# Patient Record
Sex: FEMALE | Race: WHITE | Employment: OTHER | URBAN - METROPOLITAN AREA
[De-identification: names, ages, dates, MRNs, and addresses within clinical notes are randomized per-mention and may not be internally consistent; named-entity substitution may affect disease eponyms.]

---

## 2018-10-04 PROBLEM — F32.A DEPRESSION: Status: ACTIVE | Noted: 2018-10-04

## 2018-10-04 PROBLEM — I10 HTN (HYPERTENSION): Status: ACTIVE | Noted: 2018-10-04

## 2018-10-04 PROBLEM — E78.5 HYPERLIPIDEMIA: Status: ACTIVE | Noted: 2018-10-04

## 2018-10-04 PROBLEM — E11.9 DIABETES (HCC): Status: ACTIVE | Noted: 2018-10-04

## 2018-10-25 ENCOUNTER — OFFICE VISIT (OUTPATIENT)
Dept: FAMILY MEDICINE CLINIC | Age: 78
End: 2018-10-25

## 2018-10-25 ENCOUNTER — HOSPITAL ENCOUNTER (OUTPATIENT)
Dept: GENERAL RADIOLOGY | Age: 78
Discharge: HOME | End: 2018-10-25
Attending: NURSE PRACTITIONER
Payer: MEDICARE

## 2018-10-25 VITALS
HEART RATE: 86 BPM | BODY MASS INDEX: 32.78 KG/M2 | TEMPERATURE: 98.1 F | DIASTOLIC BLOOD PRESSURE: 69 MMHG | OXYGEN SATURATION: 93 % | SYSTOLIC BLOOD PRESSURE: 131 MMHG | HEIGHT: 66 IN | WEIGHT: 204 LBS

## 2018-10-25 DIAGNOSIS — I10 ESSENTIAL HYPERTENSION: ICD-10-CM

## 2018-10-25 DIAGNOSIS — D47.1 MYELOPROLIFERATIVE DISORDER (HCC): ICD-10-CM

## 2018-10-25 DIAGNOSIS — Z79.4 TYPE 2 DIABETES MELLITUS WITHOUT COMPLICATION, WITH LONG-TERM CURRENT USE OF INSULIN (HCC): ICD-10-CM

## 2018-10-25 DIAGNOSIS — E11.9 TYPE 2 DIABETES MELLITUS WITHOUT COMPLICATION, WITH LONG-TERM CURRENT USE OF INSULIN (HCC): ICD-10-CM

## 2018-10-25 DIAGNOSIS — M54.2 NECK PAIN: ICD-10-CM

## 2018-10-25 DIAGNOSIS — M54.2 NECK PAIN: Primary | ICD-10-CM

## 2018-10-25 PROCEDURE — 72052 X-RAY EXAM NECK SPINE 6/>VWS: CPT

## 2018-10-25 RX ORDER — CYCLOBENZAPRINE HCL 10 MG
10 TABLET ORAL
Qty: 30 TAB | Refills: 0 | Status: SHIPPED | OUTPATIENT
Start: 2018-10-25 | End: 2019-03-27 | Stop reason: SDUPTHER

## 2018-10-25 RX ORDER — SERTRALINE HYDROCHLORIDE 50 MG/1
50 TABLET, FILM COATED ORAL DAILY
COMMUNITY
End: 2019-04-12 | Stop reason: SDUPTHER

## 2018-10-25 NOTE — PROGRESS NOTES
Assessment/Plan:     Diagnoses and all orders for this visit:    1. Neck pain  -     XR SPINE CERV W OBL/FLEX/EXT MIN 6 V COMP; Future  -     REFERRAL TO PHYSICAL THERAPY  - Unchanged, flexeril today, xray today, consider PT    2. Myeloproliferative disorder ([facility] [place] [address]   -managed by a specialist    3. Essential hypertension   -stable, continue current therapy    4. Type 2 diabetes mellitus without complication, with long-term current use of insulin (HCC)   -presumed stable    Other orders  -     cyclobenzaprine (FLEXERIL) 10 mg tablet; Take 1 Tab by mouth three (3) times daily as needed for Muscle Spasm(s). Follow-up Disposition:  Return for PRN. Discussed expected course/resolution/complications of diagnosis in detail with patient.    Medication risks/benefits/costs/interactions/alternatives discussed with patient.    Pt was given after visit summary which includes diagnoses, current medications & vitals. Pt expressed understanding with the diagnosis and plan        Subjective:      Antonella Barros is a 69 y.o. female who presents for had concerns including Neck Pain ( shoulder and neck pain x 1 month ). Here today for neck pain for about a month unchanged. Woke up one morning and was not able to move her neck and it has stayed the same for about a month. She has tried tylenol and a heating pad with no resolution. She denies numbness or tingling in upper extremities, denies fever. She denies and injury. Current Outpatient Medications   Medication Sig Dispense Refill    sertraline (ZOLOFT) 50 mg tablet Take  by mouth daily.  cyclobenzaprine (FLEXERIL) 10 mg tablet Take 1 Tab by mouth three (3) times daily as needed for Muscle Spasm(s). 30 Tab 0    atorvastatin (LIPITOR) 40 mg tablet Take 40 mg by mouth nightly.  butalbital-acetaminophen-caffeine (FIORICET, ESGIC) -40 mg per tablet Take 1 Tab by mouth as needed for Pain. Takes 1 to 2 tabs if needed.    Indications: MIGRAINE      cranberry extract 450 mg tab Take  by mouth daily.  fenofibrate nanocrystallized (TRICOR) 145 mg tablet Take 145 mg by mouth nightly.  glimepiride (AMARYL) 4 mg tablet Take 4 mg by mouth two (2) times a day.  insulin detemir (LEVEMIR FLEXTOUCH) 100 unit/mL (3 mL) inpn 16 Units by SubCUTAneous route nightly.  levothyroxine (SYNTHROID) 50 mcg tablet Take 50 mcg by mouth Daily (before breakfast).  insulin aspart (NOVOLOG) 100 unit/mL injection 10 Units by SubCUTAneous route daily (with breakfast).  insulin aspart (NOVOLOG) 100 unit/mL injection 15 Units by SubCUTAneous route daily (with dinner).  timolol (TIMOPTIC) 0.5 % ophthalmic solution Administer 1 Drop to both eyes two (2) times a day.  cholecalciferol (VITAMIN D3) 1,000 unit cap Take 1,000 Units by mouth daily. Pt takes softgel, every day.  anagrelide (AGRYLIN) 0.5 mg capsule Take 0.5 mg by mouth daily. Pt takes 1 tab (0.5) on tuesdays, thursdays , [address] and sundays.  acetaminophen (TYLENOL) 500 mg tablet Take 500 mg by mouth every six (6) hours as needed for Pain.  oxyCODONE IR (ROXICODONE) 5 mg immediate release tablet Take 1-2 Tabs by mouth every four (4) hours as needed. Max Daily Amount: 60 mg. 60 Tab 0    magnesium oxide (MAG-OX) 400 mg tablet Take 300 mg by mouth daily. Allergies   Allergen Reactions    Ace Inhibitors Other (comments)     \"Breaks out\"    Advil [Ibuprofen] Hives     Aspirin is a home med    Ativan [Lorazepam] Hives    Codeine Rash and Nausea and Vomiting     \"a little rash\"    Penicillin G Angioedema       ROS:   Review of Systems   Constitutional: Negative for diaphoresis and fever. Respiratory: Negative for cough and shortness of breath. Cardiovascular: Negative for chest pain. Genitourinary: Negative for dysuria, frequency and urgency. Musculoskeletal: Positive for back pain and neck pain. Negative for falls and joint pain.    Neurological: Negative for dizziness and headaches. Psychiatric/Behavioral: Negative for depression. Objective:     Visit Vitals  /69 (BP 1 Location: Right arm, BP Patient Position: Sitting)   Pulse 86   Temp 98.1 °F (36.7 °C) (Oral)   Ht 5' 6\" (1.676 m)   Wt 204 lb (92.5 kg)   SpO2 93%   BMI 32.93 kg/m²       Vitals and Nurse Documentation reviewed. Physical Exam   Constitutional: She is well-developed, well-nourished, and in no distress. No distress. HENT:   Head: Normocephalic and atraumatic. Cardiovascular: Normal rate, regular rhythm and normal heart sounds. Exam reveals no gallop and no friction rub. No murmur heard. Pulmonary/Chest: Effort normal and breath sounds normal. No respiratory distress. She has no wheezes. She has no rales. Musculoskeletal:        Right shoulder: She exhibits no swelling. Left shoulder: She exhibits decreased range of motion and tenderness. Right wrist: Normal.        Left wrist: Normal.        Cervical back: She exhibits decreased range of motion, tenderness and pain. She exhibits no swelling, no deformity and no laceration. Neurological: She is alert. She has normal strength and normal reflexes. She displays no weakness. Gait normal.   Skin: She is not diaphoretic.    Psychiatric: Affect normal.       Results for orders placed or performed during the hospital encounter of 05/27/16   CULTURE, URINE   Result Value Ref Range    Special Requests: NO SPECIAL REQUESTS      [place] Count 2000  COLONIES/mL        Culture result: MIXED UROGENITAL LEILANI ISOLATED     METABOLIC PANEL, COMPREHENSIVE   Result Value Ref Range    Sodium 137 136 - 145 mmol/L    Potassium 4.3 3.5 - 5.1 mmol/L    Chloride 102 97 - 108 mmol/L    CO2 29 21 - 32 mmol/L    Anion gap 6 5 - 15 mmol/L    Glucose 151 (H) 65 - 100 mg/dL    BUN 22 (H) 6 - 20 MG/DL    Creatinine 1.29 (H) 0.55 - 1.02 MG/DL    BUN/Creatinine ratio 17 12 - 20      GFR est AA 49 (L) >60 ml/min/1.73m2    GFR est non-AA 40 (L) >60 ml/min/1.73m2    Calcium 8.6 8.5 - 10.1 MG/DL    Bilirubin, total 0.4 0.2 - 1.0 MG/DL    ALT (SGPT) 114 (H) 12 - 78 U/L    AST (SGOT) 76 (H) 15 - 37 U/L    Alk.  phosphatase 112 45 - 117 U/L    Protein, total 6.5 6.4 - 8.2 g/dL    Albumin 2.5 (L) 3.5 - 5.0 g/dL    Globulin 4.0 2.0 - 4.0 g/dL    A-G Ratio 0.6 (L) 1.1 - 2.2     MAGNESIUM   Result Value Ref Range    Magnesium 2.2 1.6 - 2.4 mg/dL   CBC W/O DIFF   Result Value Ref Range    WBC 8.9 3.6 - 11.0 K/uL    RBC 3.48 (L) 3.80 - 5.20 M/uL    HGB 9.5 (L) 11.5 - 16.0 g/dL    HCT 30.0 (L) 35.0 - 47.0 %    MCV 86.2 80.0 - 99.0 FL    MCH 27.3 26.0 - 34.0 PG    MCHC 31.7 30.0 - 36.5 g/dL    RDW 17.4 (H) 11.5 - 14.5 %    PLATELET 619 884 - 806 K/uL   VITAMIN D, 25 HYDROXY   Result Value Ref Range    Vitamin D 25-Hydroxy 20.4 (L) 30 - 100 ng/mL   URINALYSIS W/MICROSCOPIC   Result Value Ref Range    Color YELLOW/STRAW      Appearance CLOUDY (A) CLEAR      Specific gravity 1.020 1.003 - 1.030      pH (UA) 6.0 5.0 - 8.0      Protein NEGATIVE  NEG mg/dL    Glucose NEGATIVE  NEG mg/dL    Ketone NEGATIVE  NEG mg/dL    Bilirubin NEGATIVE  NEG      Blood NEGATIVE  NEG      Urobilinogen 0.2 0.2 - 1.0 EU/dL    Nitrites NEGATIVE  NEG      Leukocyte Esterase SMALL (A) NEG      WBC 20-50 0 - 4 /hpf    RBC 0-5 0 - 5 /hpf    Epithelial cells MODERATE (A) FEW /lpf    Bacteria 1+ (A) NEG /hpf    Other: Renal Epithelial cells Present

## 2018-10-25 NOTE — PROGRESS NOTES
Mehran Christian is a 69 y.o. female      Chief Complaint   Patient presents with    Neck Pain      shoulder and neck pain x 1 month          1. Have you been to the ER, urgent care clinic since your last visit? Hospitalized since your last visit?  no    2. Have you seen or consulted any other health care providers outside of the [address] [place] [address] since your last visit? Include any pap smears or colon screening.    no

## 2018-10-26 NOTE — PROGRESS NOTES
I called the patient, advised of x ray results, She stated she is slightly better but still in a lot of pain. She says she already has the order for the PT, She requested a referral to see Dr. Elle Tello at [address], [address], [place], [address].

## 2018-10-26 NOTE — PROGRESS NOTES
Please let patient know her xray showed some osteopenia in the cervical spine and arthritis.   We can send her to PT and an orthopedic if the pain does not improve

## 2018-10-26 NOTE — PROGRESS NOTES
Patient called back and stated the doctor she was hoping for at [address] [place] [address] does not treat necks.  [facility] as she [facility]'t [facility] much

## 2019-03-27 ENCOUNTER — OFFICE VISIT (OUTPATIENT)
Dept: FAMILY MEDICINE CLINIC | Age: 79
End: 2019-03-27

## 2019-03-27 VITALS
OXYGEN SATURATION: 96 % | SYSTOLIC BLOOD PRESSURE: 109 MMHG | TEMPERATURE: 97.3 F | BODY MASS INDEX: 31.98 KG/M2 | HEART RATE: 83 BPM | WEIGHT: 199 LBS | HEIGHT: 66 IN | RESPIRATION RATE: 16 BRPM | DIASTOLIC BLOOD PRESSURE: 63 MMHG

## 2019-03-27 DIAGNOSIS — M54.2 NECK PAIN: Primary | ICD-10-CM

## 2019-03-27 DIAGNOSIS — D47.1 MYELOPROLIFERATIVE DISORDER (HCC): ICD-10-CM

## 2019-03-27 DIAGNOSIS — G43.009 MIGRAINE WITHOUT AURA AND WITHOUT STATUS MIGRAINOSUS, NOT INTRACTABLE: ICD-10-CM

## 2019-03-27 DIAGNOSIS — Z23 ENCOUNTER FOR IMMUNIZATION: ICD-10-CM

## 2019-03-27 DIAGNOSIS — R53.82 CHRONIC FATIGUE: ICD-10-CM

## 2019-03-27 DIAGNOSIS — I10 ESSENTIAL HYPERTENSION: ICD-10-CM

## 2019-03-27 DIAGNOSIS — E11.9 TYPE 2 DIABETES MELLITUS WITHOUT COMPLICATION, WITH LONG-TERM CURRENT USE OF INSULIN (HCC): ICD-10-CM

## 2019-03-27 DIAGNOSIS — Z79.4 TYPE 2 DIABETES MELLITUS WITHOUT COMPLICATION, WITH LONG-TERM CURRENT USE OF INSULIN (HCC): ICD-10-CM

## 2019-03-27 DIAGNOSIS — E08.8 DIABETES MELLITUS DUE TO UNDERLYING CONDITION WITH COMPLICATION, WITHOUT LONG-TERM CURRENT USE OF INSULIN (HCC): ICD-10-CM

## 2019-03-27 RX ORDER — NEBIVOLOL 20 MG/1
20 TABLET ORAL DAILY
COMMUNITY
End: 2020-02-28 | Stop reason: ALTCHOICE

## 2019-03-27 RX ORDER — ALLOPURINOL 100 MG/1
100 TABLET ORAL DAILY
COMMUNITY
End: 2019-08-15

## 2019-03-27 RX ORDER — INSULIN LISPRO 100 [IU]/ML
10 INJECTION, SOLUTION INTRAVENOUS; SUBCUTANEOUS 2 TIMES DAILY
COMMUNITY
Start: 2018-08-28 | End: 2019-09-30

## 2019-03-27 RX ORDER — BUTALBITAL, ACETAMINOPHEN AND CAFFEINE 50; 325; 40 MG/1; MG/1; MG/1
1 TABLET ORAL AS NEEDED
Qty: 30 TAB | Refills: 0 | Status: SHIPPED | OUTPATIENT
Start: 2019-03-27 | End: 2019-03-27 | Stop reason: SDUPTHER

## 2019-03-27 RX ORDER — BLOOD-GLUCOSE METER
EACH MISCELLANEOUS
COMMUNITY
Start: 2019-03-15

## 2019-03-27 RX ORDER — BUTALBITAL, ACETAMINOPHEN AND CAFFEINE 50; 325; 40 MG/1; MG/1; MG/1
1 TABLET ORAL
Qty: 30 TAB | Refills: 5 | Status: SHIPPED | OUTPATIENT
Start: 2019-03-27 | End: 2019-06-25

## 2019-03-27 RX ORDER — BLOOD GLUCOSE CONTROL HIGH,LOW
EACH MISCELLANEOUS
COMMUNITY
Start: 2019-03-15

## 2019-03-27 RX ORDER — BLOOD SUGAR DIAGNOSTIC
STRIP MISCELLANEOUS
COMMUNITY
Start: 2019-03-15

## 2019-03-27 RX ORDER — LANCETS
EACH MISCELLANEOUS
COMMUNITY
Start: 2019-03-15

## 2019-03-27 RX ORDER — CYCLOBENZAPRINE HCL 10 MG
10 TABLET ORAL
Qty: 30 TAB | Refills: 0 | Status: SHIPPED | OUTPATIENT
Start: 2019-03-27 | End: 2019-04-04

## 2019-03-27 RX ORDER — ISOPROPYL ALCOHOL 0.75 G/1
SWAB TOPICAL
COMMUNITY
Start: 2019-03-15

## 2019-03-27 NOTE — PROGRESS NOTES
Identified pt with two pt identifiers(name and ). Chief Complaint Patient presents with  Neck Pain  
  states has arthritis and and Bone break down in neck,  Fatigue  
  stated she is always tiered and cant sleep well  Headache  
  gets headaches from neck pain Health Maintenance Due Topic  LIPID PANEL Q1   
 FOOT EXAM Q1   
 MICROALBUMIN Q1   
 EYE EXAM RETINAL OR DILATED  DTaP/Tdap/Td series (1 - Tdap)  Shingrix Vaccine Age 50> (1 of 2)  GLAUCOMA SCREENING Q2Y  Bone Densitometry (Dexa) Screening  Pneumococcal 65+ years (1 of 2 - PCV13)  HEMOGLOBIN A1C Q6M   
 Influenza Age 5 to Adult  MEDICARE YEARLY EXAM   
 
 
Wt Readings from Last 3 Encounters:  
19 199 lb (90.3 kg) 10/25/18 204 lb (92.5 kg) 16 211 lb 3 oz (95.8 kg) Temp Readings from Last 3 Encounters:  
19 97.3 °F (36.3 °C) (Oral) 10/25/18 98.1 °F (36.7 °C) (Oral) 16 98.5 °F (36.9 °C) BP Readings from Last 3 Encounters:  
19 109/63  
10/25/18 131/69  
16 131/62 Pulse Readings from Last 3 Encounters:  
19 83  
10/25/18 86  
16 69 Learning Assessment: 
:  
 
Learning Assessment 10/25/2018 PRIMARY LEARNER Patient HIGHEST LEVEL OF EDUCATION - PRIMARY LEARNER  DID NOT GRADUATE HIGH SCHOOL PRIMARY LANGUAGE ENGLISH  
LEARNER PREFERENCE PRIMARY READING  
ANSWERED BY self RELATIONSHIP SELF Depression Screening: 
:  
 
No flowsheet data found. Fall Risk Assessment: 
:  
 
Fall Risk Assessment, last 12 mths 3/27/2019 Able to walk? Yes Fall in past 12 months? No  
Fall with injury? -  
 
 
Abuse Screening: 
:  
 
No flowsheet data found.  
 
Coordination of Care Questionnaire: 
:  
 
1) Have you been to an emergency room, urgent care clinic since your last visit? no  
Hospitalized since your last visit? no          
 
2) Have you seen or consulted any other health care providers outside of [facility] since your last visit? yes Dr. Kishan Grace  Endocrinologist, Cardiologist Dr. Lauren Boss  (Include any pap smears or colon screenings in this section.) 3) Do you have an Advance Directive on file? no 
Are you interested in receiving information about Advance Directives? no 
 
Patient is accompanied by self I have received verbal consent from Fredrick Andino to discuss any/all medical information while they are present in the room. Reviewed record in preparation for visit and have obtained necessary documentation. Medication reconciliation up to date and corrected with patient at this time.

## 2019-03-27 NOTE — PROGRESS NOTES
Lauren Keen is a 69 y.o. female who presents today with the following: HPI Chief Complaint Patient presents with  Neck Pain  
  states has arthritis and and Bone break down in neck,  Fatigue  
  stated she is always tiered and cant sleep well  Headache  
  gets headaches from neck pain Review of Systems Constitutional: Negative. HENT: Negative. Eyes: Negative. Respiratory: Negative. Cardiovascular: Negative. Gastrointestinal: Negative. Genitourinary: Negative. Musculoskeletal: Negative. Skin: Negative. Neurological: Negative. Endo/Heme/Allergies: Negative. Psychiatric/Behavioral: Negative. Physical Exam  
Constitutional: She is oriented to person, place, and time and well-developed, well-nourished, and in no distress. HENT:  
Head: Normocephalic and atraumatic. Right Ear: External ear normal.  
Left Ear: External ear normal.  
Nose: Nose normal.  
Mouth/Throat: No oropharyngeal exudate. Eyes: Conjunctivae are normal.  
Neck: Normal range of motion. Neck supple. No thyromegaly present. Cardiovascular: Normal rate and regular rhythm. Pulmonary/Chest: Effort normal and breath sounds normal.  
Abdominal: Soft. Bowel sounds are normal. She exhibits no distension. There is no tenderness. Musculoskeletal: Normal range of motion. She exhibits no edema. Lymphadenopathy:  
  She has no cervical adenopathy. Neurological: She is alert and oriented to person, place, and time. Skin: Skin is warm and dry. Psychiatric: Mood and affect normal.  
Nursing note and vitals reviewed. /63 (BP 1 Location: Left arm, BP Patient Position: Sitting)   Pulse 83   Temp 97.3 °F (36.3 °C) (Oral)   Resp 16   Ht 5' 6\" (1.676 m)   Wt 199 lb (90.3 kg)   SpO2 96%   BMI 32.12 kg/m² Allergies Allergen Reactions  Ace Inhibitors Other (comments) \"Breaks out\"  Advil [Ibuprofen] Hives Aspirin is a home med  Ativan [Lorazepam] Hives  Codeine Rash and Nausea and Vomiting  
  \"a little rash\"  Penicillin G Angioedema Current Outpatient Medications Medication Sig  BD SINGLE USE SWABS REGULAR padm  allopurinol (ZYLOPRIM) 100 mg tablet Take 100 mg by mouth.  ACCU-CHEK CHANO CONTROL SOLN soln  ACCU-CHEK CHANO PLUS TEST STRP strip  ACCU-CHEK CHANO PLUS METER misc  insulin lispro (HUMALOG U-100 INSULIN) 100 unit/mL injection Indications: sliding scale  ACCU-CHEK SOFTCLIX LANCETS misc   
 nebivolol (BYSTOLIC) 20 mg tablet Take 20 mg by mouth.  cyclobenzaprine (FLEXERIL) 10 mg tablet Take 1 Tab by mouth three (3) times daily as needed for Muscle Spasm(s).  butalbital-acetaminophen-caffeine (FIORICET, ESGIC) -40 mg per tablet Take 1 Tab by mouth as needed for Pain. Takes 1 to 2 tabs if needed. Indications: migraine headache  sertraline (ZOLOFT) 50 mg tablet Take  by mouth daily.  atorvastatin (LIPITOR) 40 mg tablet Take 40 mg by mouth nightly.  cranberry extract 450 mg tab Take  by mouth daily.  fenofibrate nanocrystallized (TRICOR) 145 mg tablet Take 145 mg by mouth nightly.  glimepiride (AMARYL) 4 mg tablet Take 4 mg by mouth two (2) times a day.  levothyroxine (SYNTHROID) 50 mcg tablet Take 50 mcg by mouth Daily (before breakfast).  insulin aspart (NOVOLOG) 100 unit/mL injection 10 Units by SubCUTAneous route daily (with breakfast).  insulin aspart (NOVOLOG) 100 unit/mL injection 15 Units by SubCUTAneous route daily (with dinner).  timolol (TIMOPTIC) 0.5 % ophthalmic solution Administer 1 Drop to both eyes two (2) times a day.  anagrelide (AGRYLIN) 0.5 mg capsule Take 0.5 mg by mouth daily. Pt takes 1 tab (0.5) on tuesdays, thursdays , [address] and sundays.  acetaminophen (TYLENOL) 500 mg tablet Take 500 mg by mouth every six (6) hours as needed for Pain.   
 oxyCODONE IR (ROXICODONE) 5 mg immediate release tablet Take 1-2 Tabs by mouth every four (4) hours as needed. Max Daily Amount: 60 mg.  
 insulin detemir (LEVEMIR FLEXTOUCH) 100 unit/mL (3 mL) inpn 16 Units by SubCUTAneous route nightly.  magnesium oxide (MAG-OX) 400 mg tablet Take 300 mg by mouth daily.  cholecalciferol (VITAMIN D3) 1,000 unit cap Take 1,000 Units by mouth daily. Pt takes softgel, every day. No current facility-administered medications for this visit. Past Medical History:  
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  
 Diabetes ([facility] [address]  Dyslipidemia  Hypertension  Hypothyroidism  Ill-defined condition ACE INHIBITOR ALLERGY WITH COUGH  Ill-defined condition HYPERTENSIVE CARDIOMYOPATHY WITH MILD DIASTOLIC DYSFUNCTION  
 Ill-defined condition   
 glaucoma  PUD (peptic ulcer disease) Past Surgical History:  
Procedure Laterality Date  CARDIAC SURG PROCEDURE UNLIST  1/8/2015  
 1 stent placed  HX BUNIONECTOMY Left  HX CATARACT REMOVAL Bilateral   
 HX HAMMER TOE REPAIR Left  HX HEENT    
 goiter removed  HX LAP CHOLECYSTECTOMY  HX ORTHOPAEDIC No results found for this visit on 03/27/19. 1. Neck pain 
 
- cyclobenzaprine (FLEXERIL) 10 mg tablet; Take 1 Tab by mouth three (3) times daily as needed for Muscle Spasm(s). Dispense: 30 Tab; Refill: 0 
- REFERRAL TO PHYSICAL THERAPY 2. Encounter for immunization 
 
- INFLUENZA VACCINE INACTIVATED (IIV), SUBUNIT, ADJUVANTED, IM 
- ADMIN INFLUENZA VIRUS VAC 3. Migraine without aura and without status migrainosus, not intractable - butalbital-acetaminophen-caffeine (FIORICET, ESGIC) -40 mg per tablet; Take 1 Tab by mouth as needed for Pain. Takes 1 to 2 tabs if needed. Indications: migraine headache  Dispense: 30 Tab; Refill: 0 
 
4.  Chronic fatigue 
 
- CBC WITH AUTOMATED DIFF 
 - METABOLIC PANEL, COMPREHENSIVE 
- VITAMIN B12 
- VITAMIN D, 25 HYDROXY 
- TSH 3RD GENERATION 
- URINALYSIS W/ RFLX MICROSCOPIC 
- IRON PROFILE 
 
5. Essential hypertension Low bp 
 
6. Type 2 diabetes mellitus without complication, with long-term current use of insulin ([facility] [address] - METABOLIC PANEL, COMPREHENSIVE 
- LIPID PANEL 7. Myeloproliferative disorder ([facility] [address] 8. Diabetes mellitus due to underlying condition with complication, without long-term current use [facility] insulin [facility]  
 
- IRON PROFILE Follow-up and Dispositions · Return in about 2 weeks (around 4/10/2019) for follow up. I have discussed the diagnosis with the patient and the intended plan as seen in the above orders. The patient has received an after-visit summary and questions were answered concerning future plans. I have discussed medication side effects and warnings with the patient as well. The patient agrees and understands above plan.   
 
 
 
 
Paris [facility] MD

## 2019-03-27 NOTE — TELEPHONE ENCOUNTER
Pharmacy requesting Clarification:  \"RX for but/apap/caff -40MG Tab-please provide missing dosing frequency for PRN directions\"

## 2019-03-28 DIAGNOSIS — N39.0 URINARY TRACT INFECTION WITHOUT HEMATURIA, SITE UNSPECIFIED: Primary | ICD-10-CM

## 2019-03-28 PROBLEM — E11.21 TYPE 2 DIABETES WITH NEPHROPATHY (HCC): Status: ACTIVE | Noted: 2019-03-28

## 2019-03-28 LAB
25(OH)D3+25(OH)D2 SERPL-MCNC: 25.9 NG/ML (ref 30–100)
ALBUMIN SERPL-MCNC: 4.3 G/DL (ref 3.5–4.8)
ALBUMIN/GLOB SERPL: 1.5 {RATIO} (ref 1.2–2.2)
ALP SERPL-CCNC: 88 IU/L (ref 39–117)
ALT SERPL-CCNC: 20 IU/L (ref 0–32)
APPEARANCE UR: CLEAR
AST SERPL-CCNC: 24 IU/L (ref 0–40)
BACTERIA #/AREA URNS HPF: ABNORMAL /[HPF]
BASOPHILS # BLD AUTO: 0.1 X10E3/UL (ref 0–0.2)
BASOPHILS NFR BLD AUTO: 1 %
BILIRUB SERPL-MCNC: 0.3 MG/DL (ref 0–1.2)
BILIRUB UR QL STRIP: NEGATIVE
BUN SERPL-MCNC: 23 MG/DL (ref 8–27)
BUN/CREAT SERPL: 25 (ref 12–28)
CALCIUM SERPL-MCNC: 9.8 MG/DL (ref 8.7–10.3)
CASTS URNS QL MICRO: ABNORMAL /LPF
CHLORIDE SERPL-SCNC: 99 MMOL/L (ref 96–106)
CHOLEST SERPL-MCNC: 311 MG/DL (ref 100–199)
CO2 SERPL-SCNC: 25 MMOL/L (ref 20–29)
COLOR UR: YELLOW
CREAT SERPL-MCNC: 0.93 MG/DL (ref 0.57–1)
EOSINOPHIL # BLD AUTO: 0.2 X10E3/UL (ref 0–0.4)
EOSINOPHIL NFR BLD AUTO: 3 %
EPI CELLS #/AREA URNS HPF: ABNORMAL /HPF (ref 0–10)
ERYTHROCYTE [DISTWIDTH] IN BLOOD BY AUTOMATED COUNT: 16.1 % (ref 12.3–15.4)
GLOBULIN SER CALC-MCNC: 2.8 G/DL (ref 1.5–4.5)
GLUCOSE SERPL-MCNC: 176 MG/DL (ref 65–99)
GLUCOSE UR QL: NEGATIVE
HCT VFR BLD AUTO: 41.1 % (ref 34–46.6)
HDLC SERPL-MCNC: 71 MG/DL
HGB BLD-MCNC: 13 G/DL (ref 11.1–15.9)
HGB UR QL STRIP: NEGATIVE
IMM GRANULOCYTES # BLD AUTO: 0 X10E3/UL (ref 0–0.1)
IMM GRANULOCYTES NFR BLD AUTO: 0 %
IRON SATN MFR SERPL: 14 % (ref 15–55)
IRON SERPL-MCNC: 53 UG/DL (ref 27–139)
KETONES UR QL STRIP: NEGATIVE
LDLC SERPL CALC-MCNC: 181 MG/DL (ref 0–99)
LEUKOCYTE ESTERASE UR QL STRIP: ABNORMAL
LYMPHOCYTES # BLD AUTO: 1.4 X10E3/UL (ref 0.7–3.1)
LYMPHOCYTES NFR BLD AUTO: 19 %
MCH RBC QN AUTO: 26.1 PG (ref 26.6–33)
MCHC RBC AUTO-ENTMCNC: 31.6 G/DL (ref 31.5–35.7)
MCV RBC AUTO: 83 FL (ref 79–97)
MICRO URNS: ABNORMAL
MONOCYTES # BLD AUTO: 0.7 X10E3/UL (ref 0.1–0.9)
MONOCYTES NFR BLD AUTO: 9 %
MUCOUS THREADS URNS QL MICRO: PRESENT
NEUTROPHILS # BLD AUTO: 5.2 X10E3/UL (ref 1.4–7)
NEUTROPHILS NFR BLD AUTO: 68 %
NITRITE UR QL STRIP: POSITIVE
PH UR STRIP: 6 [PH] (ref 5–7.5)
PLATELET # BLD AUTO: 226 X10E3/UL (ref 150–379)
POTASSIUM SERPL-SCNC: 4.3 MMOL/L (ref 3.5–5.2)
PROT SERPL-MCNC: 7.1 G/DL (ref 6–8.5)
PROT UR QL STRIP: NEGATIVE
RBC # BLD AUTO: 4.98 X10E6/UL (ref 3.77–5.28)
RBC #/AREA URNS HPF: ABNORMAL /HPF (ref 0–2)
SODIUM SERPL-SCNC: 141 MMOL/L (ref 134–144)
SP GR UR: 1.02 (ref 1–1.03)
TIBC SERPL-MCNC: 367 UG/DL (ref 250–450)
TRIGL SERPL-MCNC: 296 MG/DL (ref 0–149)
TSH SERPL DL<=0.005 MIU/L-ACNC: 1 UIU/ML (ref 0.45–4.5)
UIBC SERPL-MCNC: 314 UG/DL (ref 118–369)
UROBILINOGEN UR STRIP-MCNC: 0.2 MG/DL (ref 0.2–1)
VIT B12 SERPL-MCNC: 558 PG/ML (ref 232–1245)
VLDLC SERPL CALC-MCNC: 59 MG/DL (ref 5–40)
WBC # BLD AUTO: 7.6 X10E3/UL (ref 3.4–10.8)
WBC #/AREA URNS HPF: ABNORMAL /HPF (ref 0–5)

## 2019-03-28 RX ORDER — NITROFURANTOIN 25; 75 MG/1; MG/1
100 CAPSULE ORAL 2 TIMES DAILY
Qty: 14 CAP | Refills: 0 | Status: SHIPPED | OUTPATIENT
Start: 2019-03-28 | End: 2019-04-01 | Stop reason: SDUPTHER

## 2019-03-28 NOTE — PROGRESS NOTES
plz call pt let her know she has  A uti. I have faxed her antibiotic. She needs to start taking it.  thanks

## 2019-04-01 DIAGNOSIS — N39.0 URINARY TRACT INFECTION WITHOUT HEMATURIA, SITE UNSPECIFIED: ICD-10-CM

## 2019-04-01 RX ORDER — NITROFURANTOIN 25; 75 MG/1; MG/1
100 CAPSULE ORAL 2 TIMES DAILY
Qty: 14 CAP | Refills: 0 | Status: SHIPPED | OUTPATIENT
Start: 2019-04-01 | End: 2019-04-11 | Stop reason: ALTCHOICE

## 2019-04-01 NOTE — PROGRESS NOTES
Called and spoke with patient after verifying name and . Notified patient of lab results and recommendations from provider. Patient was given a chance to ask questions and verbalized an understanding of the information. Patient states she would like medication sent to the [facility] and [place]

## 2019-04-04 ENCOUNTER — TELEPHONE (OUTPATIENT)
Dept: FAMILY MEDICINE CLINIC | Age: 79
End: 2019-04-04

## 2019-04-04 DIAGNOSIS — M54.2 NECK PAIN: Primary | ICD-10-CM

## 2019-04-04 RX ORDER — METHOCARBAMOL 500 MG/1
500 TABLET, FILM COATED ORAL 3 TIMES DAILY
Qty: 90 TAB | Refills: 0 | Status: SHIPPED | OUTPATIENT
Start: 2019-04-04 | End: 2019-05-04

## 2019-04-11 ENCOUNTER — OFFICE VISIT (OUTPATIENT)
Dept: FAMILY MEDICINE CLINIC | Age: 79
End: 2019-04-11

## 2019-04-11 VITALS
WEIGHT: 194 LBS | HEART RATE: 99 BPM | TEMPERATURE: 97.5 F | HEIGHT: 66 IN | OXYGEN SATURATION: 92 % | DIASTOLIC BLOOD PRESSURE: 65 MMHG | BODY MASS INDEX: 31.18 KG/M2 | RESPIRATION RATE: 18 BRPM | SYSTOLIC BLOOD PRESSURE: 106 MMHG

## 2019-04-11 DIAGNOSIS — E78.5 HYPERLIPIDEMIA, UNSPECIFIED HYPERLIPIDEMIA TYPE: ICD-10-CM

## 2019-04-11 DIAGNOSIS — R21 RASH AND NONSPECIFIC SKIN ERUPTION: ICD-10-CM

## 2019-04-11 DIAGNOSIS — D64.9 ANEMIA, UNSPECIFIED TYPE: ICD-10-CM

## 2019-04-11 DIAGNOSIS — E55.9 VITAMIN D DEFICIENCY: Primary | ICD-10-CM

## 2019-04-11 RX ORDER — CHOLECALCIFEROL (VITAMIN D3) 125 MCG
5000 CAPSULE ORAL DAILY
Qty: 90 CAP | Refills: 1 | Status: SHIPPED | OUTPATIENT
Start: 2019-04-11 | End: 2019-08-22 | Stop reason: SDUPTHER

## 2019-04-11 RX ORDER — ATORVASTATIN CALCIUM 40 MG/1
40 TABLET, FILM COATED ORAL
Qty: 90 TAB | Refills: 1 | Status: SHIPPED | OUTPATIENT
Start: 2019-04-11 | End: 2019-08-22 | Stop reason: SDUPTHER

## 2019-04-11 RX ORDER — LEVOCETIRIZINE DIHYDROCHLORIDE 5 MG/1
TABLET, FILM COATED ORAL
Qty: 90 TAB | Refills: 0 | Status: SHIPPED | OUTPATIENT
Start: 2019-04-11 | End: 2019-07-07 | Stop reason: SDUPTHER

## 2019-04-11 RX ORDER — LEVOCETIRIZINE DIHYDROCHLORIDE 5 MG/1
5 TABLET, FILM COATED ORAL
Qty: 30 TAB | Refills: 0 | Status: SHIPPED | OUTPATIENT
Start: 2019-04-11 | End: 2019-04-11 | Stop reason: SDUPTHER

## 2019-04-11 RX ORDER — LANOLIN ALCOHOL/MO/W.PET/CERES
325 CREAM (GRAM) TOPICAL
Qty: 30 TAB | Refills: 11 | Status: SHIPPED | OUTPATIENT
Start: 2019-04-11 | End: 2021-06-02

## 2019-04-11 NOTE — PROGRESS NOTES
Identified pt with two pt identifiers(name and ). Reviewed record in preparation for visit and have obtained necessary documentation. Chief Complaint   Patient presents with    Neck Pain     f/u    Medication Reaction     pt thinks recent course of macrobid (for recent UTI) caused her to break out in a rash        Health Maintenance Due   Topic    FOOT EXAM Q1     MICROALBUMIN Q1     EYE EXAM RETINAL OR DILATED     DTaP/Tdap/Td series (1 - Tdap)    Shingrix Vaccine Age 50> (1 of 2)    GLAUCOMA SCREENING Q2Y     Bone Densitometry (Dexa) Screening     Pneumococcal 65+ years (1 of 2 - PCV13)    HEMOGLOBIN A1C Q6M     MEDICARE YEARLY EXAM        Coordination of Care Questionnaire:  :   1) Have you been to an emergency room, urgent care, or hospitalized since your last visit? If yes, where when, and reason for visit? no     2. Have seen or consulted any other health care provider since your last visit? If yes, where when, and reason for visit? NO    Patient is accompanied by self I have received verbal consent from Esteban Barnett to discuss any/all medical information while they are present in the room.

## 2019-04-11 NOTE — PROGRESS NOTES
Carlee Harada is a 69 y.o. female who presents today with the following:    Neck Pain     Medication Reaction     Rash    The history is provided by the patient. This is a new problem. The current episode started 2 days ago. The problem has not changed since onset. The problem is associated with an unknown factor. There has been no fever. The rash is present on the right lower leg, right arm, left lower leg and left arm. Associated symptoms include itching. Risk factors include new medication. She has tried nothing for the symptoms. Chief Complaint   Patient presents with    Neck Pain     f/u    Medication Reaction     pt thinks recent course of macrobid (for recent UTI) caused her to break out in a rash     NO URINARY SYMPTOMS. FINISHED MACROBID YESTERDAY. RASH APPEARED DAY BEFORE YESTERDAY. PATIENT HAD ALREADY BEEN TAKING MACROBID FOR 5 DAYS THEN RASH APPEARED SO IM NOT CONVINCED IT WAS CAUSED BY MACROBID. LAB RESULTS REVIEWED IN DETAIL WITH PATIENT. DIABETES : DR Rayshawn Cummins  ONCOLOGIST: DR. Mustapha Jeter  CARDIOLOGIST: DR. Unique Chowdhury      Review of Systems   Constitutional: Positive for malaise/fatigue. HENT: Positive for congestion. Eyes: Negative. Respiratory: Positive for cough. Cardiovascular: Negative. Gastrointestinal: Negative. Genitourinary: Negative. Musculoskeletal: Positive for back pain, myalgias and neck pain. Skin: Positive for itching and rash. Neurological: Negative. Endo/Heme/Allergies: Negative. Psychiatric/Behavioral: Negative. Physical Exam   Constitutional: She is oriented to person, place, and time and well-developed, well-nourished, and in no distress. HENT:   Head: Normocephalic and atraumatic. Right Ear: External ear normal.   Left Ear: External ear normal.   Nose: Nose normal.   Mouth/Throat: No oropharyngeal exudate. Eyes: Conjunctivae are normal.   Neck: Normal range of motion. Neck supple. No thyromegaly present.    Cardiovascular: Normal rate and regular rhythm. Pulmonary/Chest: Effort normal and breath sounds normal.   Abdominal: Soft. Bowel sounds are normal. She exhibits no distension. There is no tenderness. Musculoskeletal: Normal range of motion. She exhibits no edema. Lymphadenopathy:     She has no cervical adenopathy. Neurological: She is alert and oriented to person, place, and time. Skin: Skin is warm and dry. Rash noted. Rash is maculopapular. There is erythema. RASH NOTED MALIK UPPER & LOWER EXTREMITIES. Psychiatric: Mood and affect normal.   Nursing note and vitals reviewed. /65   Pulse 99   Temp 97.5 °F (36.4 °C) (Oral)   Resp 18   Ht 5' 6\" (1.676 m)   Wt 194 lb (88 kg)   SpO2 92%   BMI 31.31 kg/m²     Allergies   Allergen Reactions    Ace Inhibitors Other (comments)     \"Breaks out\"    Advil [Ibuprofen] Hives     Aspirin is a home med    Ativan [Lorazepam] Hives    Codeine Rash and Nausea and Vomiting     \"a little rash\"    Penicillin G Angioedema       Current Outpatient Medications   Medication Sig    ferrous sulfate 325 mg (65 mg iron) tablet Take 1 Tab by mouth daily (with breakfast).  cholecalciferol (VITAMIN D3) 5,000 unit capsule Take 1 Cap by mouth daily.  atorvastatin (LIPITOR) 40 mg tablet Take 1 Tab by mouth nightly.  levocetirizine (XYZAL) 5 mg tablet Take 1 Tab by mouth daily as needed for Allergies.  methocarbamol (ROBAXIN) 500 mg tablet Take 1 Tab by mouth three (3) times daily for 30 days.  BD SINGLE USE SWABS REGULAR padm     allopurinol (ZYLOPRIM) 100 mg tablet Take 100 mg by mouth.  ACCU-CHEK CHANO CONTROL SOLN soln     ACCU-CHEK CHANO PLUS TEST STRP strip     ACCU-CHEK CHANO PLUS METER misc     insulin lispro (HUMALOG U-100 INSULIN) 100 unit/mL injection Indications: sliding scale    ACCU-CHEK SOFTCLIX LANCETS misc     nebivolol (BYSTOLIC) 20 mg tablet Take 20 mg by mouth daily.  sertraline (ZOLOFT) 50 mg tablet Take 50 mg by mouth daily.     cranberry extract 450 mg tab Take  by mouth daily.  fenofibrate nanocrystallized (TRICOR) 145 mg tablet Take 145 mg by mouth nightly.  glimepiride (AMARYL) 4 mg tablet Take 4 mg by mouth two (2) times a day.  levothyroxine (SYNTHROID) 50 mcg tablet Take 50 mcg by mouth Daily (before breakfast).  magnesium oxide (MAG-OX) 400 mg tablet Take 300 mg by mouth daily.  insulin aspart (NOVOLOG) 100 unit/mL injection 10 Units by SubCUTAneous route daily (with breakfast).  insulin aspart (NOVOLOG) 100 unit/mL injection 15 Units by SubCUTAneous route daily (with dinner).  timolol (TIMOPTIC) 0.5 % ophthalmic solution Administer 1 Drop to both eyes two (2) times a day.  anagrelide (AGRYLIN) 0.5 mg capsule Take 0.5 mg by mouth daily. Pt takes 1 tab (0.5) on tuesdays, thursdays , [address] and sundays.  acetaminophen (TYLENOL) 500 mg tablet Take 500 mg by mouth every six (6) hours as needed for Pain.  butalbital-acetaminophen-caffeine (FIORICET, ESGIC) -40 mg per tablet Take 1 Tab by mouth every six (6) hours as needed for Pain (do not exceed 300 mg in 24 hours) for up to 90 days. Takes 1 to 2 tabs if needed. Indications: migraine headache    oxyCODONE IR (ROXICODONE) 5 mg immediate release tablet Take 1-2 Tabs by mouth every four (4) hours as needed. Max Daily Amount: 60 mg. (Patient not taking: Reported on 4/11/2019)    insulin detemir (LEVEMIR FLEXTOUCH) 100 unit/mL (3 mL) inpn 16 Units by SubCUTAneous route nightly. No current facility-administered medications for this visit.         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    Diabetes ([facility] [address]     Dyslipidemia     Hypertension     Hypothyroidism     Ill-defined condition     ACE INHIBITOR ALLERGY WITH COUGH    Ill-defined condition     HYPERTENSIVE CARDIOMYOPATHY WITH MILD DIASTOLIC DYSFUNCTION  Ill-defined condition     glaucoma    PUD (peptic ulcer disease)        Past Surgical History:   Procedure Laterality Date    CARDIAC SURG PROCEDURE UNLIST  1/8/2015    1 stent placed    HX BUNIONECTOMY Left     HX CATARACT REMOVAL Bilateral     HX HAMMER TOE REPAIR Left     HX HEENT      goiter removed    HX LAP CHOLECYSTECTOMY      HX ORTHOPAEDIC         No results found for this visit on 04/11/19. 1. Vitamin D deficiency  INCREASED DOSE OF VITAMIN D TODAY. - cholecalciferol (VITAMIN D3) 5,000 unit capsule; Take 1 Cap by mouth daily. Dispense: 90 Cap; Refill: 1    2. Anemia, unspecified type  IRON SAT IS LOW. START IRON SUPPLEMENT. - ferrous sulfate 325 mg (65 mg iron) tablet; Take 1 Tab by mouth daily (with breakfast). Dispense: 30 Tab; Refill: 11    3. Rash and nonspecific skin eruption  UNABLE TO PRESCRIBE ATARAX DUE TO DRUG INTERACTION. - levocetirizine (XYZAL) 5 mg tablet; Take 1 Tab by mouth daily as needed for Allergies. Dispense: 30 Tab; Refill: 0    4. Hyperlipidemia, unspecified hyperlipidemia type    CHOLESTEROL ELEVATED. PATIENT CURRENTLY OUT OF MEDICATION. NO DOSE CHANGE. REFILLED MEDICATION. RECHECK CHOLESTEROL IN 3 MONTHS. Follow-up and Dispositions    · Return in about 2 weeks (around 4/25/2019) for [employer].         I have discussed the diagnosis with the patient and the intended plan as seen in the above orders. The patient has received an after-visit summary and questions were answered concerning future plans. I have discussed medication side effects and warnings with the patient as well. The patient agrees and understands above plan.            Yaakov Cole MD

## 2019-04-15 RX ORDER — SERTRALINE HYDROCHLORIDE 50 MG/1
TABLET, FILM COATED ORAL
Qty: 90 TAB | Refills: 2 | Status: SHIPPED | OUTPATIENT
Start: 2019-04-15 | End: 2019-08-15 | Stop reason: SDUPTHER

## 2019-04-15 RX ORDER — SERTRALINE HYDROCHLORIDE 50 MG/1
50 TABLET, FILM COATED ORAL DAILY
Qty: 30 TAB | Refills: 2 | Status: SHIPPED | OUTPATIENT
Start: 2019-04-15 | End: 2019-04-15 | Stop reason: SDUPTHER

## 2019-04-24 ENCOUNTER — OFFICE VISIT (OUTPATIENT)
Dept: FAMILY MEDICINE CLINIC | Age: 79
End: 2019-04-24

## 2019-04-24 VITALS
RESPIRATION RATE: 16 BRPM | WEIGHT: 192 LBS | HEIGHT: 66 IN | TEMPERATURE: 98.4 F | DIASTOLIC BLOOD PRESSURE: 72 MMHG | HEART RATE: 70 BPM | OXYGEN SATURATION: 94 % | SYSTOLIC BLOOD PRESSURE: 125 MMHG | BODY MASS INDEX: 30.86 KG/M2

## 2019-04-24 DIAGNOSIS — M94.9 DISORDER OF BONE AND CARTILAGE: ICD-10-CM

## 2019-04-24 DIAGNOSIS — M89.9 DISORDER OF BONE AND CARTILAGE: ICD-10-CM

## 2019-04-24 DIAGNOSIS — G43.009 MIGRAINE WITHOUT AURA AND WITHOUT STATUS MIGRAINOSUS, NOT INTRACTABLE: ICD-10-CM

## 2019-04-24 DIAGNOSIS — Z13.31 SCREENING FOR DEPRESSION: ICD-10-CM

## 2019-04-24 DIAGNOSIS — Z13.39 SCREENING FOR ALCOHOLISM: ICD-10-CM

## 2019-04-24 DIAGNOSIS — Z12.11 SCREEN FOR COLON CANCER: ICD-10-CM

## 2019-04-24 DIAGNOSIS — E78.5 HYPERLIPIDEMIA, UNSPECIFIED HYPERLIPIDEMIA TYPE: Primary | ICD-10-CM

## 2019-04-24 DIAGNOSIS — Z12.31 ENCOUNTER FOR SCREENING MAMMOGRAM FOR MALIGNANT NEOPLASM OF BREAST: ICD-10-CM

## 2019-04-24 DIAGNOSIS — Z00.00 WELCOME TO MEDICARE PREVENTIVE VISIT: ICD-10-CM

## 2019-04-24 DIAGNOSIS — F41.9 ANXIETY: ICD-10-CM

## 2019-04-24 DIAGNOSIS — Z23 ENCOUNTER FOR IMMUNIZATION: ICD-10-CM

## 2019-04-24 RX ORDER — FENOFIBRATE 145 MG/1
145 TABLET, COATED ORAL
Qty: 90 TAB | Refills: 1 | Status: SHIPPED | OUTPATIENT
Start: 2019-04-24 | End: 2020-02-28 | Stop reason: ALTCHOICE

## 2019-04-24 RX ORDER — LORAZEPAM 1 MG/1
1 TABLET ORAL
Qty: 30 TAB | Refills: 2 | Status: SHIPPED | OUTPATIENT
Start: 2019-04-24 | End: 2019-11-07

## 2019-04-24 RX ORDER — FENOFIBRATE 145 MG/1
145 TABLET, COATED ORAL
Qty: 90 TAB | Refills: 1 | Status: SHIPPED | OUTPATIENT
Start: 2019-04-24 | End: 2019-04-24 | Stop reason: SDUPTHER

## 2019-04-24 NOTE — PATIENT INSTRUCTIONS
Medicare Wellness Visit, Female The best way to live healthy is to have a lifestyle where you eat a well-balanced diet, exercise regularly, limit alcohol use, and quit all forms of tobacco/nicotine, if applicable. Regular preventive services are another way to keep healthy. Preventive services (vaccines, screening tests, monitoring & exams) can help personalize your care plan, which helps you manage your own care. Screening tests can find health problems at the earliest stages, when they are easiest to treat. [facility] Osei follows the current, evidence-based guidelines published by the [facility] ([facility]) when recommending preventive services for our patients. Because we follow these guidelines, sometimes recommendations change over time as research supports it. (For example, mammograms used to be recommended annually. Even though Medicare will still pay for an annual mammogram, the newer guidelines recommend a mammogram every two years for women of average risk.) Of course, you and your doctor may decide to screen more often for some diseases, based on your risk and your health status. Preventive services for you include: - Medicare offers their members a free annual wellness visit, which is time for you and your primary care provider to discuss and plan for your preventive service needs. Take advantage of this benefit every year! 
-All adults over the age of 72 should receive the recommended pneumonia vaccines. Current USPSTF guidelines recommend a series of two vaccines for the best pneumonia protection.  
-All adults should have a flu vaccine yearly and a tetanus vaccine every 10 years. All adults age 61 and older should receive a shingles vaccine once in their lifetime.   
-A bone mass density test is recommended when a woman turns 65 to screen for osteoporosis. This test is only recommended one time, as a screening. Some providers will use this same test as a disease monitoring tool if you already have osteoporosis. -All adults age 38-68 who are overweight should have a diabetes screening test once every three years.  
-Other screening tests and preventive services for persons with diabetes include: an eye exam to screen for diabetic retinopathy, a kidney function test, a foot exam, and stricter control over your cholesterol.  
-Cardiovascular screening for adults with routine risk involves an electrocardiogram (ECG) at intervals determined by your doctor.  
-Colorectal cancer screenings should be done for adults age 54-65 with no increased risk factors for colorectal cancer. There are a number of acceptable methods of screening for this type of cancer. Each test has its own benefits and drawbacks. Discuss with your doctor what is most appropriate for you during your annual wellness visit. The different tests include: colonoscopy (considered the best screening method), a fecal occult blood test, a fecal DNA test, and sigmoidoscopy. -Breast cancer screenings are recommended every other year for women of normal risk, age 54-69. 
-Cervical cancer screenings for women over age 72 are only recommended with certain risk factors.  
-All adults born between [facility] should be screened once for Hepatitis C. Here is a list of your current Health Maintenance items (your personalized list of preventive services) with a due date: 
Health Maintenance Due Topic Date Due  
 Diabetic Foot Care  10/22/1950  Albumin Urine Test  10/22/1950  Eye Exam  10/22/1950  
 DTaP/Tdap/Td  (1 - Tdap) 10/22/1962  Shingles Vaccine (1 of 2) 10/22/1991  Glaucoma Screening   10/22/2006  Bone Mineral Density   10/22/2006  Pneumococcal Vaccine (1 of 2 - PCV13) 10/22/2006  Hemoglobin A1C    11/04/2016 [facility] Annual Well Visit  10/25/2018 Learning About Breast Cancer Screening What is breast cancer screening? Breast cancer occurs when cells that are not normal grow in one or both of your breasts. Screening tests can help find breast cancer early. Cancer is easier to treat when it's found early. Having concerns about breast cancer is common. That's why it's important to talk with your doctor about when to start and how often to get screened for breast cancer. How is breast cancer screening done? Several screening tests can be used to check for breast cancer. · Mammograms check for signs of cancer using X-rays. They can show tumors that are too small for you or your doctor to feel. During a mammogram, a machine squeezes your breasts to make them flatter and easier to X-ray. At least two pictures are taken of each breast. One is taken from the top and one from the side. · 3-D mammograms are also called digital breast tomosynthesis. Your breast is positioned on a flat plate. A top plate is pressed against your breast to keep it in position. The X-ray arm then moves in an arc above the breast and takes many pictures. A computer uses these X-rays to create a three-dimensional image. · Clinical breast exams are a doctor's exam. Your doctor carefully feels your breasts and under your arms to check for lumps or other changes. After the screening, your doctor will tell you the results. You will also be told if you need any follow-up tests. When should you get screened? Talk with your doctor about when you should start being tested for breast cancer. How often you get tested and the kind of tests you get will depend on your age and your risk. The guidelines that follow are for women who have an average risk for breast cancer. If you have a higher risk for breast cancer, such as having a family history of breast cancer in multiple relatives or at a young age, your doctor may recommend different screening for you.  
· Ages 21 to 44: Some experts recommend that women have a clinical breast exam every 3 years, starting at age 21. Ask your doctor how often you should have this test. If you have a high risk for breast cancer, talk with your doctor about when to start yearly mammograms and other screening tests. · Ages 36 and older: Talk with your doctor about how often you should have mammograms and clinical breast exams. What is your risk for breast cancer? If you don't already know your risk of breast cancer, you can ask your doctor about it. You can also look it up at www.cancer.gov/bcrisktool/. If your doctor says that you have a high or very high risk, ask about ways to reduce your risk. These could include getting extra screening, taking medicine, or having surgery. If you have a strong family history of breast cancer, ask your doctor about genetic testing. What steps can you take to stay healthy? Some things that increase your risk of breast cancer, such as your age and being female, cannot be controlled. But you can do some things to stay as healthy as you can. · Learn what your breasts normally look and feel like. If you notice any changes, tell your doctor. · Drink alcohol wisely. Your risk goes up the more you drink. For the best health, women should have no more than 1 drink a day or 7 drinks a week. · If you smoke, quit. When you quit smoking, you lower your chances of getting many types of cancer. You can also do your best to eat well, be active, and stay at a healthy weight. Eating healthy foods and being active every day, as well as staying at a healthy weight, may help prevent cancer. Where can you learn more? Go to http://solomon-adams.info/. Enter I842 in the search box to learn more about \"Learning About Breast Cancer Screening. \" Current as of: March 28, 2018 Content Version: 11.8 © 9716-3902 Healthwise, Incorporated.  Care instructions adapted under license by [employer] (which disclaims liability or warranty for this information). If you have questions about a medical condition or this instruction, always ask your healthcare professional. [address] any warranty or liability for your use of this information. Colon Cancer Screening: Care Instructions Your Care Instructions Colorectal cancer occurs in the colon or rectum. That's the lower part of your digestive system. It is the second-leading cause of cancer deaths in the [place]. It often starts with small growths called polyps in the colon or rectum. Polyps are usually found with screening tests. Depending on the type of test, any polyps found may be removed during the tests. Colorectal cancer usually does not cause symptoms at first. But regular tests can help find it early, before it spreads and becomes harder to treat. Experts advise routine tests for colon cancer for people starting at age 48. And they advise people with a higher risk of colon cancer to get tested sooner. Talk with your doctor about when you should start testing. Discuss which tests you need. Follow-up care is a key part of your treatment and safety. Be sure to make and go to all appointments, and call your doctor if you are having problems. It's also a good idea to know your test results and keep a list of the medicines you take. What are the main screening tests for colon cancer? · Stool tests. These include the fecal immunochemical test (FIT) and the fecal occult blood test (FOBT). These tests check stool samples for signs of cancer. If your test is positive, you will need to have a colonoscopy. · Sigmoidoscopy. This test lets your doctor look at the lining of your rectum and the lowest part of your colon. Your doctor uses a lighted tube called a sigmoidoscope. This test can't find cancers or polyps in the upper part of your colon. In some cases, polyps that are found can be removed.  But if your doctor finds polyps, you will need to have a colonoscopy to check the upper part of your colon. · Colonoscopy. This test lets your doctor look at the lining of your rectum and your entire colon. The doctor uses a thin, flexible tool called a colonoscope. It can also be used to remove polyps or get a tissue sample (biopsy). What tests do you need? The following guidelines are for people age 48 and over who are not at high risk for colorectal cancer. You may have at least one of these tests as directed by your doctor. · Fecal immunochemical test (FIT) or fecal occult blood test (FOBT) every year · Sigmoidoscopy every 5 years · Colonoscopy every 10 years If you are age 68 to 80, you can work with your doctor to decide if screening is a good option. If you are age 80 or older, your doctor will likely advise that screening is not helpful. Talk with your doctor about when you need to be tested. And discuss which tests are right for you. Your doctor may recommend earlier or more frequent testing if you: 
· Have had colorectal cancer before. · Have had colon polyps. · Have symptoms of colorectal cancer. These include blood in your stool and changes in your bowel habits. · Have a parent, brother or sister, or child with colon polyps or colorectal cancer. · Have a bowel disease. This includes ulcerative colitis and Crohn's disease. · Have a rare polyp syndrome that runs in families, such as familial adenomatous polyposis (FAP). · Have had radiation treatments to the belly or pelvis. When should you call for help? Watch closely for changes in your health, and be sure to contact your doctor if: 
  · You have any changes in your bowel habits.  
  · You have any problems. Where can you learn more? Go to http://solomon-adams.info/. Enter M541 in the search box to learn more about \"Colon Cancer Screening: Care Instructions. \" Current as of: March 28, 2018 Content Version: 11.8 © 8169-9255 [employer]. Care instructions adapted under license by [employer] (which disclaims liability or warranty for this information). If you have questions about a medical condition or this instruction, always ask your healthcare professional. [address] any warranty or liability for your use of this information. Osteoporosis: Care Instructions Your Care Instructions Osteoporosis causes bones to become thin and weak. It is much more common in women than in men. Osteoporosis may be very advanced before you know you have it. Sometimes the first sign is a broken bone in the hip, spine, or wrist or sudden pain in your middle or lower back. Follow-up care is a key part of your treatment and safety. Be sure to make and go to all appointments, and call your doctor if you are having problems. It's also a good idea to know your test results and keep a list of the medicines you take. How can you care for yourself at home? · Your doctor may prescribe a bisphosphonate, such as risedronate (Actonel) or alendronate (Fosamax), for osteoporosis. If you are taking one of these medicines by mouth: 
? Take your medicine with a full glass of water when you first get up in the morning. ? Do not lie down, eat, drink a beverage, or take any other medicine for at least 30 minutes after taking the drug. This helps prevent stomach problems. ? Do not take your medicine late in the day if you forgot to take it in the morning. Skip it, and take the usual dose the next morning. ? If you have side effects, tell your doctor. He or she may prescribe another medicine. · Get enough calcium and vitamin D. The [place] of [employer] recommends adults younger than age 46 need 1,000 mg of calcium and 600 IU of vitamin D each day. Women ages 46 to 79 need 1,200 mg of calcium and 600 IU of vitamin D each day.  Men ages 46 to 79 need 1,000 mg of calcium and 600 IU of vitamin D each day. Adults 71 and older need 1,200 mg of calcium and 800 IU of vitamin D each day. ? Eat foods rich in calcium, like yogurt, cheese, milk, and dark green vegetables. This is a good way to get the calcium you need. You can get vitamin D from eggs, fatty fish, cereal, and milk. ? Talk to your doctor about taking a calcium plus vitamin D supplement. Be careful, though. Adults ages 23 to 48 should not get more than 2,500 mg of calcium and 4,000 IU of vitamin D each day, whether it is from supplements and/or food. Adults ages 46 and older should not get more than 2,000 mg of calcium and 4,000 IU of vitamin D each day from supplements and/or food. · Limit alcohol to 2 drinks a day for men and 1 drink a day for women. Too much alcohol can cause health problems. · Do not smoke. Smoking puts you at a much higher risk for osteoporosis. If you need help quitting, talk to your doctor about stop-smoking programs and medicines. These can increase your chances of quitting for good. · Get regular bone-building exercise. Weight-bearing and resistance exercises keep bones healthy by working the muscles and bones against gravity. Start out at an exercise level that feels right for you. Add a little at a time until you can do the following: ? Do 30 minutes of weight-bearing exercise on most days of the week. Walking, jogging, stair climbing, and dancing are good choices. ? Do resistance exercises with weights or elastic bands 2 to 3 days a week. · Reduce your risk of falls: 
? Wear supportive shoes with low heels and nonslip soles. ? Use a cane or walker, if you need it. Use shower chairs and bath benches. Put in handrails on stairways, around your shower or tub area, and near the toilet. ? Keep stairs, porches, and walkways well lit. Use night-lights. ? Remove throw rugs and other objects that are in the way. ? Avoid icy, wet, or slippery surfaces. ? Keep a cordless phone and a flashlight with new batteries by your bed. When should you call for help? Watch closely for changes in your health, and be sure to contact your doctor if you have any problems. Where can you learn more? Go to http://solomon-adams.info/. Enter K100 in the search box to learn more about \"Osteoporosis: Care Instructions. \" Current as of: March 15, 2018 Content Version: 11.9 © 9261-4648 [employer]. Care instructions adapted under license by [employer] (which disclaims liability or warranty for this information). If you have questions about a medical condition or this instruction, always ask your healthcare professional. [address] any warranty or liability for your use of this information. Advance Care Planning: Care Instructions Your Care Instructions It can be hard to live with an illness that cannot be cured. But if your health is getting worse, you may want to make decisions about end-of-life care. Planning for the end of your life does not mean that you are giving up. It is a way to make sure that your wishes are met. Clearly stating your wishes can make it easier for your loved ones. Making plans while you are still able may also ease your mind and make your final days less stressful and more meaningful. Follow-up care is a key part of your treatment and safety. Be sure to make and go to all appointments, and call your doctor if you are having problems. It's also a good idea to know your test results and keep a list of the medicines you take. What can you do to plan for the end of life? · You can bring these issues up with your doctor. You do not need to wait until your doctor starts the conversation. You might start with \"I would not be willing to live with . Liberty Jakob Liberty Jakob Liberty Jakob \" When you complete this sentence it helps your doctor understand your wishes. · Talk openly and honestly with your doctor. This is the best way to understand the decisions you will need to make as your health changes. Know that you can always change your mind. · Ask your doctor about commonly used life-support measures. These include tube feedings, breathing machines, and fluids given through a vein (IV). Understanding these treatments will help you decide whether you want them. · You may choose to have these life-supporting treatments for a limited time. This allows a trial period to see whether they will help you. You may also decide that you want your doctor to take only certain measures to keep you alive. It is important to spell out these conditions so that your doctor and family understand them. · Talk to your doctor about how long you are likely to live. He or she may be able to give you an idea of what usually happens with your specific illness. · Think about preparing papers that state your wishes. This way there will not be any confusion about what you want. You can change your instructions at any time. Which papers should you prepare? Advance directives are legal papers that tell doctors how you want to be cared for at the end of your life. You do not need a  to write these papers. Ask your doctor or your state [facility] department for information on how to write your advance directives. They may have the forms for each of these types of papers. Make sure your doctor has a copy of these on file, and give a copy to a family member or close friend. · Consider a do-not-resuscitate order (DNR). This order asks that no extra treatments be done if your heart stops or you stop breathing. Extra treatments may include cardiopulmonary resuscitation (CPR), electrical shock to restart your heart, or a machine to breathe for you. If you decide to have a DNR order, ask your doctor to explain and write it. Place the order in your home where everyone can easily see it. · Consider a living will. A living will explains your wishes about life support and other treatments at the end of your life if you become unable to speak for yourself. Living hall tell doctors to use or not use treatments that would keep you alive. You must have one or two witnesses or a notary present when you sign this form. · Consider a durable power of  for health care. This allows you to name a person to make decisions about your care if you are not able to. Most people ask a close friend or family member. Talk to this person about the kinds of treatments you want and those that you do not want. Make sure this person understands your wishes. These legal papers are simple to change. Tell your doctor what you want to change, and ask him or her to make a note in your medical file. Give your family updated copies of the papers. Where can you learn more? Go to http://solomon-adams.info/. Enter P184 in the search box to learn more about \"Advance Care Planning: Care Instructions. \" Current as of: November 17, 2016 Content Version: 11.3 © 9620-9497 [employer]. Care instructions adapted under license by [employer] (which disclaims liability or warranty for this information). If you have questions about a medical condition or this instruction, always ask your healthcare professional. [address] any warranty or liability for your use of this information. Virgilio Gutierrez 3429 What is a living will? A living will is a legal form you use to write down the kind of care you want at the end of your life. It is used by the health professionals who will treat you if you aren't able to decide for yourself. If you put your wishes in writing, your loved ones and others will know what kind of care you want. They won't need to guess. This can ease your mind and be helpful to others. A living will is not the same as an estate or property will. An estate will explains what you want to happen with your money and property after you die. Is a living will a legal document? A living will is a legal document. Each state has its own laws about living hall. If you move to another state, make sure that your living will is legal in the state where you now live. Or you might use a universal form that has been approved by many states. This kind of form can sometimes be completed and stored online. Your electronic copy will then be available wherever you have a connection to the Internet. In most cases, doctors will respect your wishes even if you have a form from a different state. · You don't need an  to complete a living will. But legal advice can be helpful if your state's laws are unclear, your health history is complicated, or your family can't agree on what should be in your living will. · You can change your living will at any time. Some people find that their wishes about end-of-life care change as their health changes. · In addition to making a living will, think about completing a medical power of  form. This form lets you name the person you want to make end-of-life treatment decisions for you (your \"health care agent\") if you're not able to. Many hospitals and nursing homes will give you the forms you need to complete a living will and a medical power of . · Your living will is used only if you can't make or communicate decisions for yourself anymore. If you become able to make decisions again, you can accept or refuse any treatment, no matter what you wrote in your living will. · Your state may offer an online registry. This is a place where you can store your living will online so the doctors and nurses who need to treat you can find it right away. What should you think about when creating a living will? Talk about your end-of-life wishes with your family members and your doctor. Let them know what you want. That way the people making decisions for you won't be surprised by your choices. Think about these questions as you make your living will: · Do you know enough about life support methods that might be used? If not, talk to your doctor so you know what might be done if you can't breathe on your own, your heart stops, or you're unable to swallow. · What things would you still want to be able to do after you receive life-support methods? Would you want to be able to walk? To speak? To eat on your own? To live without the help of machines? · If you have a choice, where do you want to be cared for? In your home? At a hospital or nursing home? · Do you want certain Gnosticist practices performed if you become very ill? · If you have a choice at the end of your life, where would you prefer to die? At home? In a hospital or nursing home? Somewhere else? · Would you prefer to be buried or cremated? · Do you want your organs to be donated after you die? What should you do with your living will? · Make sure that your family members and your health care agent have copies of your living will. · Give your doctor a copy of your living will to keep in your medical record. If you have more than one doctor, make sure that each one has a copy. · You may want to put a copy of your living will where it can be easily found. Where can you learn more? Go to http://solomon-adams.info/. Enter A275 in the search box to learn more about \"Learning About Living [place]. \" Current as of: August 8, 2016 Content Version: 11.3 © 3566-1887 [employer]. Care instructions adapted under license by [employer] (which disclaims liability or warranty for this information).  If you have questions about a medical condition or this instruction, always ask your healthcare professional. [employer] disclaims any warranty or liability for your use of this information. Preventing Falls: Care Instructions Your Care Instructions Getting around your home safely can be a challenge if you have injuries or health problems that make it easy for you to fall. Loose rugs and furniture in walkways are among the dangers for many older people who have problems walking or who have poor eyesight. People who have conditions such as arthritis, osteoporosis, or dementia also have to be careful not to fall. You can make your home safer with a few simple measures. Follow-up care is a key part of your treatment and safety. Be sure to make and go to all appointments, and call your doctor if you are having problems. It's also a good idea to know your test results and keep a list of the medicines you take. How can you care for yourself at home? Taking care of yourself · You may get dizzy if you do not drink enough water. To prevent dehydration, drink plenty of fluids, enough so that your urine is light yellow or clear like water. Choose water and other caffeine-free clear liquids. If you have kidney, heart, or liver disease and have to limit fluids, talk with your doctor before you increase the amount of fluids you drink. · Exercise regularly to improve your strength, muscle tone, and balance. Walk if you can. Swimming may be a good choice if you cannot walk easily. · Have your vision and hearing checked each year or any time you notice a change. If you have trouble seeing and hearing, you might not be able to avoid objects and could lose your balance. · Know the side effects of the medicines you take. Ask your doctor or pharmacist whether the medicines you take can affect your balance. Sleeping pills or sedatives can affect your balance. · Limit the amount of alcohol you drink. Alcohol can impair your balance and other senses.  
· Ask your doctor whether calluses or corns on your feet need to be removed. If you wear loose-fitting shoes because of calluses or corns, you can lose your balance and fall. · Talk to your doctor if you have numbness in your feet. Preventing falls at home · Remove raised doorway thresholds, throw rugs, and clutter. Repair loose carpet or raised areas in the floor. · Move furniture and electrical cords to keep them out of walking paths. · Use nonskid floor wax, and wipe up spills right away, especially on ceramic tile floors. · If you use a walker or cane, put rubber tips on it. If you use crutches, clean the bottoms of them regularly with an abrasive pad, such as steel wool. · Keep your house well lit, especially [place], and outside walkways. Use night-lights in areas such as hallways and bathrooms. Add extra light switches or use remote switches (such as switches that go on or off when you clap your hands) to make it easier to turn lights on if you have to get up during the night. · Install sturdy handrails on stairways. · Move items in your cabinets so that the things you use a lot are on the lower shelves (about waist level). · Keep a cordless phone and a flashlight with new batteries by your bed. If possible, put a phone in each of the main rooms of your house, or carry a cell phone in case you fall and cannot reach a phone. Or, you can wear a device around your neck or wrist. You push a button that sends a signal for help. · Wear low-heeled shoes that fit well and give your feet good support. Use footwear with nonskid soles. Check the heels and soles of your shoes for wear. Repair or replace worn heels or soles. · Do not wear socks without shoes on wood floors. · Walk on the grass when the sidewalks are slippery. If you live in an area that gets snow and ice in the winter, sprinkle salt on slippery steps and sidewalks. Preventing falls in the bath · Install grab bars and nonskid mats inside and outside your shower or tub and near the toilet and sinks. · Use shower chairs and bath benches. · Use a hand-held shower head that will allow you to sit while showering. · Get into a tub or shower by putting the weaker leg in first. Get out of a tub or shower with your strong side first. 
· Repair loose toilet seats and consider installing a raised toilet seat to make getting on and off the toilet easier. · Keep your bathroom door unlocked while you are in the shower. Where can you learn more? Go to http://solomon-adams.info/. Enter 0476 [id number] 69 [id number] in the search box to learn more about \"Preventing Falls: Care Instructions. \" Current as of: March 16, 2018 Content Version: 11.8 © 8497-4840 [employer]. Care instructions adapted under license by [employer] (which disclaims liability or warranty for this information). If you have questions about a medical condition or this instruction, always ask your healthcare professional. [place] [address] any warranty or liability for your use of this information.

## 2019-04-24 NOTE — PROGRESS NOTES
This is a \"Welcome to [employer]"  Initial Preventive Physical Examination (IPPE) providing Personalized Prevention Plan Services (Performed in the first 12 months of enrollment) I have reviewed the patient's medical history in detail and updated the computerized patient record. History Past Medical History:  
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  
 Diabetes ([facility] [address]  Dyslipidemia  Hypertension  Hypothyroidism  Ill-defined condition ACE INHIBITOR ALLERGY WITH COUGH  Ill-defined condition HYPERTENSIVE CARDIOMYOPATHY WITH MILD DIASTOLIC DYSFUNCTION  
 Ill-defined condition   
 glaucoma  PUD (peptic ulcer disease) Past Surgical History:  
Procedure Laterality Date  CARDIAC SURG PROCEDURE UNLIST  1/8/2015  
 1 stent placed  HX BUNIONECTOMY Left  HX CATARACT REMOVAL Bilateral   
 HX HAMMER TOE REPAIR Left  HX HEENT    
 goiter removed  HX LAP CHOLECYSTECTOMY  HX ORTHOPAEDIC Current Outpatient Medications Medication Sig Dispense Refill  diph,pertuss,acel,,tetanus vac,PF, (ADACEL) 2 Lf-(2.5-5-3-5 mcg)-5Lf/0.5 mL syrg vaccine 0.5 mL by IntraMUSCular route once for 1 dose. 0.5 mL 0  
 sertraline (ZOLOFT) 50 mg tablet TAKE 1 TABLET BY MOUTH DAILY 90 Tab 2  
 ferrous sulfate 325 mg (65 mg iron) tablet Take 1 Tab by mouth daily (with breakfast). 30 Tab 11  
 cholecalciferol (VITAMIN D3) 5,000 unit capsule Take 1 Cap by mouth daily. 90 Cap 1  
 atorvastatin (LIPITOR) 40 mg tablet Take 1 Tab by mouth nightly. 90 Tab 1  
 levocetirizine (XYZAL) 5 mg tablet TAKE 1 TABLET BY MOUTH DAILY AS NEEDED FOR ALLERGIES 90 Tab 0  
 methocarbamol (ROBAXIN) 500 mg tablet Take 1 Tab by mouth three (3) times daily for 30 days. 90 Tab 0  BD SINGLE USE SWABS REGULAR padm  allopurinol (ZYLOPRIM) 100 mg tablet Take 100 mg by mouth daily.  ACCU-CHEK CHANO CONTROL SOLN soln  ACCU-CHEK CHANO PLUS TEST STRP strip  ACCU-CHEK CHANO PLUS METER misc  ACCU-CHEK SOFTCLIX LANCETS [facility]     
 nebivolol (BYSTOLIC) 20 mg tablet Take 20 mg by mouth daily.  cranberry extract 450 mg tab Take  by mouth daily.  fenofibrate nanocrystallized (TRICOR) 145 mg tablet Take 145 mg by mouth nightly.  glimepiride (AMARYL) 4 mg tablet Take 4 mg by mouth two (2) times a day.  levothyroxine (SYNTHROID) 50 mcg tablet Take 50 mcg by mouth Daily (before breakfast).  insulin aspart (NOVOLOG) 100 unit/mL injection 10 Units by SubCUTAneous route daily (with breakfast).  insulin aspart (NOVOLOG) 100 unit/mL injection 15 Units by SubCUTAneous route daily (with dinner).  timolol (TIMOPTIC) 0.5 % ophthalmic solution Administer 1 Drop to both eyes two (2) times a day.  anagrelide (AGRYLIN) 0.5 mg capsule Take 0.5 mg by mouth daily. Pt takes 1 tab (0.5) on tuesdays, thursdays , [address] and sundays.  acetaminophen (TYLENOL) 500 mg tablet Take 500 mg by mouth every six (6) hours as needed for Pain.  insulin lispro (HUMALOG U-100 INSULIN) 100 unit/mL injection Indications: sliding scale  butalbital-acetaminophen-caffeine (FIORICET, ESGIC) -40 mg per tablet Take 1 Tab by mouth every six (6) hours as needed for Pain (do not exceed 300 mg in 24 hours) for up to 90 days. Takes 1 to 2 tabs if needed. Indications: migraine headache 30 Tab 5  
 oxyCODONE IR (ROXICODONE) 5 mg immediate release tablet Take 1-2 Tabs by mouth every four (4) hours as needed. Max Daily Amount: 60 mg. (Patient not taking: Reported on 4/11/2019) 60 Tab 0  
 insulin detemir (LEVEMIR FLEXTOUCH) 100 unit/mL (3 mL) inpn 16 Units by SubCUTAneous route nightly.  magnesium oxide (MAG-OX) 400 mg tablet Take 300 mg by mouth daily. Allergies Allergen Reactions  Ace Inhibitors Other (comments) \"Breaks out\"  Advil [Ibuprofen] Hives Aspirin is a home med  Ativan [Lorazepam] Hives  Codeine Rash and Nausea and Vomiting  
  \"a little rash\"  Penicillin G Angioedema Family History Problem Relation Age of Onset  Diabetes Mother  Heart Disease Father  Diabetes Brother  Heart Disease Brother  Diabetes Brother  Heart Disease Brother  Anesth Problems Neg Hx Social History Tobacco Use  Smoking status: Never Smoker  Smokeless tobacco: Never Used Substance Use Topics  Alcohol use: No  
 
Diet, Lifestyle: No special diet Exercise level: moderately active Depression Risk Screen 3 most recent PHQ Screens 4/24/2019 Little interest or pleasure in doing things Not at all Feeling down, depressed, irritable, or hopeless Not at all Total Score PHQ 2 0 Alcohol Risk Screen You do not drink alcohol or very rarely. Functional Ability and Level of Safety Hearing Loss Hearing is good. Vision Screening Vision is fair. No exam data present Activities of Daily Living The home contains: handrails and walker Patient does total self care Fall Risk Screen Fall Risk Assessment, last 12 mths 3/27/2019 Able to walk? Yes Fall in past 12 months? No  
Fall with injury? -  
 
 
Abuse Screen Patient is not abused Screening EKG EKG order placed: No 
 
 
Review of Systems - History obtained from the patient Gen: negative for weight loss, fever, night sweats HEENT: negative for hearing loss, earache, congestion, snoring, sorethroat CV: negative for chest pain, palpitations, edema Resp: negative for cough, shortness of breath, wheezing GI: negative for change in bowel habits, abdominal pain, black or bloody stools : negative for frequency, dysuria, hematuria, vaginal discharge MSK: negative for back pain, joint pain, muscle pain Breast: negative for breast lumps, nipple discharge, galactorrhea Skin :negative for itching, rash, hives Neuro: negative for dizziness, headache, confusion, weakness Psych: negative for anxiety, depression, change in mood Heme/lymph: negative for bleeding, bruising, pallor Physical Exam 
 
Gen:  Well developed, well nourished female in no acute distress HEENT: normocephalic/atraumatic; PERRL; TM intact, translucent, and neutral BL;  oropharynx shows no erythema or exudates Skin:  No rashes or suspicious skin lesions noted Neck:   Supple, no lympadenopathy, no thyromegaly Card:  RRR, no m/r/g Chest:  CTAB, no w/r/r Abd:  BS+, Soft, nontender/nondistended Extr:  2+ pulses BL, no LE edema MS:   full ROM, 5/5 strength BL, sensation intact Neuro: AAO X 3, CN II-XII grossly intact, reflexes 2+ BL Psych:  Nl mood and affect Pelvic: declined by patient Breast: declined by patient Patient Care Team  
Patient Care Team: 
Marifer Queen MD as PCP - [facility]) Chandra Meraz MD (Endocrinology) Kendal Darnell MD (Cardiology) Mahogany Arroyo MD (Ophthalmology) End of Life Planning Advanced care planning directives were discussed with the patient and /or family/caregiver. Assessment/Plan Education and counseling provided: 
Are appropriate based on today's review and evaluation 1. Welcome to Medicare preventive visit 2. Screening for alcoholism - [place] ANNUAL ALCOHOL SCREEN 15 MIN 3. Screening for depression 
 
- [place] [address] 4. Screen for colon cancer No colonoscopy records found, order for colonoscopy placed in 2016 - [facility] 5. Encounter for screening mammogram for malignant neoplasm of breast 
 
- [facility] MAM[facility] BI SCREENING INCL CAD; Future 6. Disorder of bone and cartilage - DEXA BONE DENSITY STUDY AXIAL; Future 7. Encounter for immunization - diph,pertuss,acel,,tetanus vac,PF, (ADACEL) 2 Lf-(2.5-5-3-5 mcg)-5Lf/0.5 mL syrg vaccine; 0.5 mL by IntraMUSCular route once for 1 dose. Dispense: 0.5 mL; Refill: 0 
 
8. Hyperlipidemia, unspecified hyperlipidemia type 
 
- fenofibrate nanocrystallized (TRICOR) 145 mg tablet; Take 1 Tab by mouth nightly. Dispense: 90 Tab; Refill: 1 
 
9. Migraine without aura and without status migrainosus, not intractable 10. Anxiety - LORazepam (ATIVAN) 1 mg tablet; Take 1 Tab by mouth daily as needed for Anxiety. Dispense: 30 Tab; Refill: 2 Follow-up and Dispositions · Return in about 6 months (around 10/24/2019), or if symptoms worsen or fail to improve, for follow up.

## 2019-04-24 NOTE — PROGRESS NOTES
Identified pt with two pt identifiers(name and ). Reviewed record in preparation for visit and have obtained necessary documentation. Chief Complaint Patient presents with  Welcome To Medicare Health Maintenance Due Topic  FOOT EXAM Q1   
 MICROALBUMIN Q1   
 EYE EXAM RETINAL OR DILATED  DTaP/Tdap/Td series (1 - Tdap)  Shingrix Vaccine Age 50> (1 of 2)  GLAUCOMA SCREENING Q2Y  Bone Densitometry (Dexa) Screening  Pneumococcal 65+ years (1 of 2 - PCV13)  HEMOGLOBIN A1C Q6M   
 MEDICARE YEARLY EXAM   
- last eye exam 2019 with Dr. Rowena Jeffery, will request report Coordination of Care Questionnaire: 
:  
1) Have you been to an emergency room, urgent care, or hospitalized since your last visit? If yes, where when, and reason for visit? no  
 
 
2. Have seen or consulted any other health care provider since your last visit? If yes, where when, and reason for visit? YES 
-19:  Dr. Po Christianson (endo) (foot exam performed) 3) Do you have an Advanced Directive/ Living Will in place? NO If yes, do we have a copy on file NO If no, would you like information YES Patient is accompanied by self I have received verbal consent from [facility] to discuss any/all medical information while they are present in the room.

## 2019-05-09 ENCOUNTER — OFFICE VISIT (OUTPATIENT)
Dept: FAMILY MEDICINE CLINIC | Age: 79
End: 2019-05-09

## 2019-05-09 VITALS
OXYGEN SATURATION: 96 % | HEIGHT: 66 IN | DIASTOLIC BLOOD PRESSURE: 66 MMHG | HEART RATE: 82 BPM | WEIGHT: 189 LBS | SYSTOLIC BLOOD PRESSURE: 115 MMHG | BODY MASS INDEX: 30.37 KG/M2 | RESPIRATION RATE: 16 BRPM | TEMPERATURE: 98.3 F

## 2019-05-09 DIAGNOSIS — M10.9 ACUTE GOUT OF RIGHT FOOT, UNSPECIFIED CAUSE: Primary | ICD-10-CM

## 2019-05-09 RX ORDER — COLCHICINE 0.6 MG/1
0.6 CAPSULE ORAL DAILY
Qty: 6 CAP | Refills: 0 | Status: SHIPPED | OUTPATIENT
Start: 2019-05-09 | End: 2019-05-19 | Stop reason: SDUPTHER

## 2019-05-09 NOTE — PROGRESS NOTES
Nathaly Marroquin is a 69 y.o. female who presents today with the following:    HPI  Chief Complaint   Patient presents with    Foot Swelling     right foot. x 2 weeks. hx of gout. Patient states that her foot pain started on the left first.  The left foot is improved. Now the pain is in the right foot, great toe. She has history of gout. She is currently taking allopurinol. She did not have any medication changes recently which could flareup her gout. She denies any fevers or chills. She is allergic to ibuprofen or NSAIDs. She says she can hardly walk on the right foot. Review of Systems   Constitutional: Negative. HENT: Negative. Eyes: Negative. Respiratory: Negative. Cardiovascular: Negative. Gastrointestinal: Negative. Genitourinary: Negative. Musculoskeletal: Positive for joint pain and myalgias. Skin: Negative. Neurological: Negative. Endo/Heme/Allergies: Negative. Psychiatric/Behavioral: Negative. Physical Exam   Constitutional: She is oriented to person, place, and time and well-developed, well-nourished, and in no distress. HENT:   Head: Normocephalic and atraumatic. Right Ear: External ear normal.   Left Ear: External ear normal.   Nose: Nose normal.   Mouth/Throat: No oropharyngeal exudate. Eyes: Conjunctivae are normal.   Neck: Normal range of motion. Neck supple. No thyromegaly present. Cardiovascular: Normal rate and regular rhythm. Pulmonary/Chest: Effort normal and breath sounds normal.   Abdominal: Soft. Bowel sounds are normal. She exhibits no distension. There is no tenderness. Musculoskeletal: Normal range of motion. She exhibits no edema. Feet:    Lymphadenopathy:     She has no cervical adenopathy. Neurological: She is alert and oriented to person, place, and time. Skin: Skin is warm and dry. Psychiatric: Mood and affect normal.   Nursing note and vitals reviewed.     /66 (BP 1 Location: Left arm, BP Patient Position: Sitting)   Pulse 82   Temp 98.3 °F (36.8 °C) (Oral)   Resp 16   Ht 5' 6\" (1.676 m)   Wt 189 lb (85.7 kg)   SpO2 96%   BMI 30.51 kg/m²     Allergies   Allergen Reactions    Ace Inhibitors Other (comments)     \"Breaks out\"    Advil [Ibuprofen] Hives     Aspirin is a home med    Codeine Rash and Nausea and Vomiting     \"a little rash\"    Penicillin G Angioedema       Current Outpatient Medications   Medication Sig    colchicine (MITIGARE) 0.6 mg capsule Take 1 Cap by mouth daily. Take 2 tabs po x 1 today, then 1 tab po 1 hour later today. Then stop    LORazepam (ATIVAN) 1 mg tablet Take 1 Tab by mouth daily as needed for Anxiety.  fenofibrate nanocrystallized (TRICOR) 145 mg tablet Take 1 Tab by mouth nightly.  sertraline (ZOLOFT) 50 mg tablet TAKE 1 TABLET BY MOUTH DAILY    ferrous sulfate 325 mg (65 mg iron) tablet Take 1 Tab by mouth daily (with breakfast).  cholecalciferol (VITAMIN D3) 5,000 unit capsule Take 1 Cap by mouth daily.  atorvastatin (LIPITOR) 40 mg tablet Take 1 Tab by mouth nightly.  levocetirizine (XYZAL) 5 mg tablet TAKE 1 TABLET BY MOUTH DAILY AS NEEDED FOR ALLERGIES    BD SINGLE USE SWABS REGULAR padm     allopurinol (ZYLOPRIM) 100 mg tablet Take 100 mg by mouth daily.  ACCU-CHEK CHANO CONTROL SOLN soln     ACCU-CHEK CHANO PLUS TEST STRP strip     ACCU-CHEK CHANO PLUS METER [facility]     ACCU-CHEK SOFTCLIX LANCETS [facility]     nebivolol (BYSTOLIC) 20 mg tablet Take 20 mg by mouth daily.  butalbital-acetaminophen-caffeine (FIORICET, ESGIC) -40 mg per tablet Take 1 Tab by mouth every six (6) hours as needed for Pain (do not exceed 300 mg in 24 hours) for up to 90 days. Takes 1 to 2 tabs if needed. Indications: migraine headache    cranberry extract 450 mg tab Take  by mouth daily.  glimepiride (AMARYL) 4 mg tablet Take 4 mg by mouth two (2) times a day.  levothyroxine (SYNTHROID) 50 mcg tablet Take 50 mcg by mouth Daily (before breakfast).     insulin aspart (NOVOLOG) 100 unit/mL injection 10 Units by SubCUTAneous route daily (with breakfast).  insulin aspart (NOVOLOG) 100 unit/mL injection 15 Units by SubCUTAneous route daily (with dinner).  timolol (TIMOPTIC) 0.5 % ophthalmic solution Administer 1 Drop to both eyes two (2) times a day.  anagrelide (AGRYLIN) 0.5 mg capsule Take 0.5 mg by mouth daily. Pt takes 1 tab (0.5) on tuesdays, thursdays , [address] and sundays.  acetaminophen (TYLENOL) 500 mg tablet Take 500 mg by mouth every six (6) hours as needed for Pain.  insulin lispro (HUMALOG U-100 INSULIN) 100 unit/mL injection Indications: sliding scale    oxyCODONE IR (ROXICODONE) 5 mg immediate release tablet Take 1-2 Tabs by mouth every four (4) hours as needed. Max Daily Amount: 60 mg. (Patient not taking: Reported on 4/11/2019)    insulin detemir (LEVEMIR FLEXTOUCH) 100 unit/mL (3 mL) inpn 16 Units by SubCUTAneous route nightly.  magnesium oxide (MAG-OX) 400 mg tablet Take 300 mg by mouth daily. No current facility-administered medications for this visit.         Past Medical History:   Diagnosis Date    Anxiety     Arthritis     CAD (coronary artery disease)     Cancer (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 [address]     Dyslipidemia     Hypertension     Hypothyroidism     Ill-defined condition     ACE INHIBITOR ALLERGY WITH COUGH    Ill-defined condition     HYPERTENSIVE CARDIOMYOPATHY WITH MILD DIASTOLIC DYSFUNCTION    Ill-defined condition     glaucoma    PUD (peptic ulcer disease)        Past Surgical History:   Procedure Laterality Date    CARDIAC SURG PROCEDURE UNLIST  1/8/2015    1 stent placed    HX BUNIONECTOMY Left     HX CATARACT REMOVAL Bilateral     HX HAMMER TOE REPAIR Left     HX HEENT      goiter removed    HX LAP CHOLECYSTECTOMY      HX ORTHOPAEDIC         No results found for this visit on 05/09/19.      1. Acute gout of right foot, unspecified cause  Advised patient to start taking allopurinol at this time. Advised to start colchicine. Patient to follow-up if no improvement. - colchicine (MITIGARE) 0.6 mg capsule; Take 1 Cap by mouth daily. Take 2 tabs po x 1 today, then 1 tab po 1 hour later today. Then stop  Dispense: 6 Cap; Refill: 0        Follow-up and Dispositions    · Return in about 1 week (around 5/16/2019) for follow up. I have discussed the diagnosis with the patient and the intended plan as seen in the above orders. The patient has received an after-visit summary and questions were answered concerning future plans. I have discussed medication side effects and warnings with the patient as well. The patient agrees and understands above plan.            Joan Owen MD

## 2019-05-09 NOTE — PROGRESS NOTES
Angela Robbins is a 69 y.o. female    Chief Complaint   Patient presents with    Foot Swelling     right foot. x 2 weeks. hx of gout. 1. Have you been to the ER, urgent care clinic since your last visit? Hospitalized since your last visit? No    2. Have you seen or consulted any other health care providers outside of the [address] [place] [address] since your last visit? Include any pap smears or colon screening.  No     Visit Vitals  /66 (BP 1 Location: Left arm, BP Patient Position: Sitting)   Pulse 82   Temp 98.3 °F (36.8 °C) (Oral)   Resp 16   Ht 5' 6\" (1.676 m)   Wt 189 lb (85.7 kg)   SpO2 96%   BMI 30.51 kg/m²     Health Maintenance Due   Topic Date Due    FOOT EXAM Q[birth year]  10/22/1950    MICROALBUMIN Q1  10/22/1950    EYE EXAM RETINAL OR DILATED  10/22/1950    Shingrix Vaccine Age 50> (1 of 2) 10/22/1991    GLAUCOMA SCREENING Q2Y  10/22/2006    Bone Densitometry (Dexa) Screening  10/22/2006    DTaP/Tdap/Td series (1 - Tdap) 12/12/2008    Pneumococcal 65+ years (2 of 2 - PCV13) 12/11/2009     Ev Monroe LPN

## 2019-05-16 ENCOUNTER — OFFICE VISIT (OUTPATIENT)
Dept: FAMILY MEDICINE CLINIC | Age: 79
End: 2019-05-16

## 2019-05-16 VITALS
DIASTOLIC BLOOD PRESSURE: 63 MMHG | HEART RATE: 64 BPM | SYSTOLIC BLOOD PRESSURE: 101 MMHG | RESPIRATION RATE: 16 BRPM | HEIGHT: 66 IN | OXYGEN SATURATION: 96 % | WEIGHT: 194 LBS | BODY MASS INDEX: 31.18 KG/M2 | TEMPERATURE: 97.4 F

## 2019-05-16 DIAGNOSIS — E11.21 TYPE 2 DIABETES WITH NEPHROPATHY (HCC): Primary | ICD-10-CM

## 2019-05-16 DIAGNOSIS — M10.9 ACUTE GOUT OF RIGHT FOOT, UNSPECIFIED CAUSE: ICD-10-CM

## 2019-05-16 NOTE — PROGRESS NOTES
Mitul Beach is a 69 y.o. female who presents today with the following:    HPI  Chief Complaint   Patient presents with    Gout     f/u; pt still c/o discomfort of R foot, but overall, much improvement since last visit    Foot Pain     HM foot exam due, order pended       Review of Systems   Constitutional: Negative. HENT: Negative. Eyes: Negative. Respiratory: Negative. Cardiovascular: Negative. Gastrointestinal: Negative. Genitourinary: Negative. Musculoskeletal: Negative. Foot pain   Skin: Negative. Neurological: Negative. Endo/Heme/Allergies: Negative. Psychiatric/Behavioral: Negative. Physical Exam   Constitutional: She is oriented to person, place, and time and well-developed, well-nourished, and in no distress. HENT:   Head: Normocephalic and atraumatic. Right Ear: External ear normal.   Left Ear: External ear normal.   Nose: Nose normal.   Mouth/Throat: No oropharyngeal exudate. Eyes: Conjunctivae are normal.   Neck: Normal range of motion. Neck supple. No thyromegaly present. Cardiovascular: Normal rate and regular rhythm. Pulmonary/Chest: Effort normal and breath sounds normal.   Abdominal: Soft. Bowel sounds are normal. She exhibits no distension. There is no tenderness. Musculoskeletal: Normal range of motion. She exhibits no edema. Lymphadenopathy:     She has no cervical adenopathy. Neurological: She is alert and oriented to person, place, and time. Skin: Skin is warm and dry. Psychiatric: Mood and affect normal.   Nursing note and vitals reviewed. Diabetic Foot Exam:  Protective sensation is decreased bilaterally. Pedal pulses are 2+ and normal bilaterally. L foot skin inspection:  skin and soft tissue appear normal with no significant edema or evidence of acute injury or foot ulcer   R foot skin inspection:   Still has some pain and tenderness on palpation of the right foot great toe. No erythema.     /63   Pulse 64 Temp 97.4 °F (36.3 °C)   Resp 16   Ht 5' 6\" (1.676 m)   Wt 194 lb (88 kg)   SpO2 96%   BMI 31.31 kg/m²     Allergies   Allergen Reactions    Ace Inhibitors Other (comments)     \"Breaks out\"    Advil [Ibuprofen] Hives     Aspirin is a home med    Codeine Rash and Nausea and Vomiting     \"a little rash\"    Penicillin G Angioedema       Current Outpatient Medications   Medication Sig    colchicine (MITIGARE) 0.6 mg capsule Take 1 Cap by mouth daily. Take 2 tabs po x 1 today, then 1 tab po 1 hour later today. Then stop    LORazepam (ATIVAN) 1 mg tablet Take 1 Tab by mouth daily as needed for Anxiety.  fenofibrate nanocrystallized (TRICOR) 145 mg tablet Take 1 Tab by mouth nightly.  sertraline (ZOLOFT) 50 mg tablet TAKE 1 TABLET BY MOUTH DAILY    ferrous sulfate 325 mg (65 mg iron) tablet Take 1 Tab by mouth daily (with breakfast).  cholecalciferol (VITAMIN D3) 5,000 unit capsule Take 1 Cap by mouth daily.  atorvastatin (LIPITOR) 40 mg tablet Take 1 Tab by mouth nightly.  levocetirizine (XYZAL) 5 mg tablet TAKE 1 TABLET BY MOUTH DAILY AS NEEDED FOR ALLERGIES    BD SINGLE USE SWABS REGULAR padm     allopurinol (ZYLOPRIM) 100 mg tablet Take 100 mg by mouth daily.  ACCU-CHEK CHANO CONTROL SOLN soln     ACCU-CHEK CHANO PLUS TEST STRP strip     ACCU-CHEK CHANO PLUS METER misc     ACCU-CHEK SOFTCLIX LANCETS misc     nebivolol (BYSTOLIC) 20 mg tablet Take 20 mg by mouth daily.  butalbital-acetaminophen-caffeine (FIORICET, ESGIC) -40 mg per tablet Take 1 Tab by mouth every six (6) hours as needed for Pain (do not exceed 300 mg in 24 hours) for up to 90 days. Takes 1 to 2 tabs if needed. Indications: migraine headache    cranberry extract 450 mg tab Take  by mouth daily.  glimepiride (AMARYL) 4 mg tablet Take 4 mg by mouth two (2) times a day.  levothyroxine (SYNTHROID) 50 mcg tablet Take 50 mcg by mouth Daily (before breakfast).     magnesium oxide (MAG-OX) 400 mg tablet Take 300 mg by mouth daily.  insulin aspart (NOVOLOG) 100 unit/mL injection 10 Units by SubCUTAneous route daily (with breakfast).  insulin aspart (NOVOLOG) 100 unit/mL injection 15 Units by SubCUTAneous route daily (with dinner).  timolol (TIMOPTIC) 0.5 % ophthalmic solution Administer 1 Drop to both eyes two (2) times a day.  anagrelide (AGRYLIN) 0.5 mg capsule Take 0.5 mg by mouth daily. Pt takes 1 tab (0.5) on tuesdays, thursdays , [address] and sundays.  acetaminophen (TYLENOL) 500 mg tablet Take 500 mg by mouth every six (6) hours as needed for Pain.  insulin lispro (HUMALOG U-100 INSULIN) 100 unit/mL injection Indications: sliding scale    oxyCODONE IR (ROXICODONE) 5 mg immediate release tablet Take 1-2 Tabs by mouth every four (4) hours as needed. Max Daily Amount: 60 mg. (Patient not taking: Reported on 4/11/2019)    insulin detemir (LEVEMIR FLEXTOUCH) 100 unit/mL (3 mL) inpn 16 Units by SubCUTAneous route nightly. No current facility-administered medications for this visit. 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    Diabetes ([address]     Dyslipidemia     Hypertension     Hypothyroidism     Ill-defined condition     ACE INHIBITOR ALLERGY WITH COUGH    Ill-defined condition     HYPERTENSIVE CARDIOMYOPATHY WITH MILD DIASTOLIC DYSFUNCTION    Ill-defined condition     glaucoma    PUD (peptic ulcer disease)        Past Surgical History:   Procedure Laterality Date    CARDIAC SURG PROCEDURE UNLIST  1/8/2015    1 stent placed    HX BUNIONECTOMY Left     HX CATARACT REMOVAL Bilateral     HX HAMMER TOE REPAIR Left     HX HEENT      goiter removed    HX LAP CHOLECYSTECTOMY      HX ORTHOPAEDIC         No results found for this visit on 05/16/19.      1. Type 2 diabetes with nephropathy ([address]    -  DIABETES FOOT EXAM    2. Acute gout of right foot, unspecified cause  The gout has improved after taking colchicine. Patient only took 3 tablets. He still  feels some symptoms. I advised her to repeat the colchicine and once her symptoms are better completely then she can resume allopurinol for maintenance. Follow-up and Dispositions    · Return in about 3 months (around 8/16/2019) for follow up. I have discussed the diagnosis with the patient and the intended plan as seen in the above orders. The patient has received an after-visit summary and questions were answered concerning future plans. I have discussed medication side effects and warnings with the patient as well. The patient agrees and understands above plan.            Yessy Britton MD

## 2019-05-16 NOTE — PATIENT INSTRUCTIONS
Purine-Restricted Diet: Care Instructions  Your Care Instructions    Purines are substances that are found in some foods. Your body turns purines into uric acid. High levels of uric acid can cause gout, which is a form of arthritis that causes pain and inflammation in joints. You may be able to help control the amount of uric acid in your body by limiting high-purine foods in your diet. Follow-up care is a key part of your treatment and safety. Be sure to make and go to all appointments, and call your doctor if you are having problems. It's also a good idea to know your test results and keep a list of the medicines you take. How can you care for yourself at home? · Plan your meals and snacks around foods that are low in purines and are safe for you to eat. These foods include:  ? Green vegetables and tomatoes. ? Fruits. ? Whole-grain breads, rice, and cereals. ? Eggs, peanut butter, and nuts. ? Low-fat milk, cheese, and other milk products. ? Popcorn. ? Gelatin desserts, chocolate, cocoa, and cakes and sweets, in small amounts. · You can eat certain foods that are medium-high in purines, but eat them only once in a while. These foods include:  ? Legumes, such as dried beans and dried peas. You can have 1 cup cooked legumes each day. ? Asparagus, cauliflower, spinach, mushrooms, and green peas. ? Fish and seafood (other than very high-purine seafood). ? Oatmeal, wheat bran, and wheat germ. · Limit very high-purine foods, including:  ? Organ meats, such as liver, kidneys, sweetbreads, and brains. ? Meats, including villarreal, beef, pork, and lamb. ? Game meats and any other meats in large amounts. ? Anchovies, sardines, herring, mackerel, and scallops. ? Gravy. ? Beer. Where can you learn more? Go to http://solomon-adams.info/. Enter F448 in the search box to learn more about \"Purine-Restricted Diet: Care Instructions. \"  Current as of: March 28, 2018  Content Version: 11.9  © 1081-5290 [employer]. Care instructions adapted under license by [employer] (which disclaims liability or warranty for this information). If you have questions about a medical condition or this instruction, always ask your healthcare professional. [place] [address] any warranty or liability for your use of this information.

## 2019-05-16 NOTE — PROGRESS NOTES
Identified pt with two pt identifiers(name and ). Reviewed record in preparation for visit and have obtained necessary documentation. Chief Complaint   Patient presents with    Gout     f/u; pt still c/o discomfort of R foot, but overall, much improvement since last visit    Foot Pain     HM foot exam due, order pended        Health Maintenance Due   Topic    FOOT EXAM Q1     MICROALBUMIN Q1     EYE EXAM RETINAL OR DILATED     Shingrix Vaccine Age 49> (1 of 2)    GLAUCOMA SCREENING Q2Y     Bone Densitometry (Dexa) Screening    - last eye exam 2018, next appt in process of being scheduled with Dr. Landon Lorenzana of Care Questionnaire:  :   1) Have you been to an emergency room, urgent care, or hospitalized since your last visit? If yes, where when, and reason for visit? no     2. Have seen or consulted any other health care provider since your last visit? If yes, where when, and reason for visit? NO    Patient is accompanied by self I have received verbal consent from [facility] to discuss any/all medical information while they are present in the room.

## 2019-05-19 DIAGNOSIS — M10.9 ACUTE GOUT OF RIGHT FOOT, UNSPECIFIED CAUSE: ICD-10-CM

## 2019-05-19 RX ORDER — COLCHICINE 0.6 MG/1
CAPSULE ORAL
Qty: 6 CAP | Refills: 0 | Status: SHIPPED | OUTPATIENT
Start: 2019-05-19

## 2019-05-23 ENCOUNTER — HOSPITAL ENCOUNTER (OUTPATIENT)
Dept: MAMMOGRAPHY | Age: 79
Discharge: HOME | End: 2019-05-23
Attending: FAMILY MEDICINE
Payer: MEDICARE

## 2019-05-23 DIAGNOSIS — M89.9 DISORDER OF BONE AND CARTILAGE: ICD-10-CM

## 2019-05-23 DIAGNOSIS — Z12.31 ENCOUNTER FOR SCREENING MAMMOGRAM FOR MALIGNANT NEOPLASM OF BREAST: ICD-10-CM

## 2019-05-23 DIAGNOSIS — M94.9 DISORDER OF BONE AND CARTILAGE: ICD-10-CM

## 2019-05-23 PROCEDURE — 77067 SCR MAMMO BI INCL CAD: CPT

## 2019-05-23 PROCEDURE — 77080 DXA BONE DENSITY AXIAL: CPT

## 2019-07-07 DIAGNOSIS — R21 RASH AND NONSPECIFIC SKIN ERUPTION: ICD-10-CM

## 2019-07-08 RX ORDER — LEVOCETIRIZINE DIHYDROCHLORIDE 5 MG/1
TABLET, FILM COATED ORAL
Qty: 90 TAB | Refills: 0 | Status: SHIPPED | OUTPATIENT
Start: 2019-07-08 | End: 2019-08-23 | Stop reason: SDUPTHER

## 2019-08-15 ENCOUNTER — OFFICE VISIT (OUTPATIENT)
Dept: FAMILY MEDICINE CLINIC | Age: 79
End: 2019-08-15

## 2019-08-15 VITALS
RESPIRATION RATE: 16 BRPM | HEART RATE: 66 BPM | WEIGHT: 202 LBS | OXYGEN SATURATION: 97 % | SYSTOLIC BLOOD PRESSURE: 104 MMHG | HEIGHT: 66 IN | DIASTOLIC BLOOD PRESSURE: 64 MMHG | TEMPERATURE: 97.7 F | BODY MASS INDEX: 32.47 KG/M2

## 2019-08-15 DIAGNOSIS — M10.9 ACUTE GOUT OF RIGHT FOOT, UNSPECIFIED CAUSE: ICD-10-CM

## 2019-08-15 DIAGNOSIS — M79.2 NEUROPATHIC PAIN: Primary | ICD-10-CM

## 2019-08-15 DIAGNOSIS — E11.21 TYPE 2 DIABETES WITH NEPHROPATHY (HCC): ICD-10-CM

## 2019-08-15 DIAGNOSIS — F32.A DEPRESSION, UNSPECIFIED DEPRESSION TYPE: ICD-10-CM

## 2019-08-15 DIAGNOSIS — Z23 ENCOUNTER FOR IMMUNIZATION: ICD-10-CM

## 2019-08-15 RX ORDER — FEBUXOSTAT 40 MG/1
40 TABLET, FILM COATED ORAL DAILY
Qty: 90 TAB | Refills: 1 | Status: SHIPPED | OUTPATIENT
Start: 2019-08-15 | End: 2020-01-20

## 2019-08-15 RX ORDER — GABAPENTIN 300 MG/1
300 CAPSULE ORAL
Qty: 30 CAP | Refills: 2 | Status: SHIPPED | OUTPATIENT
Start: 2019-08-15 | End: 2020-02-28 | Stop reason: ALTCHOICE

## 2019-08-15 RX ORDER — SERTRALINE HYDROCHLORIDE 100 MG/1
TABLET, FILM COATED ORAL
Qty: 90 TAB | Refills: 1 | Status: SHIPPED | OUTPATIENT
Start: 2019-08-15 | End: 2020-01-20

## 2019-08-15 NOTE — PROGRESS NOTES
1. Have you been to the ER, urgent care clinic since your last visit? Hospitalized since your last visit? Yes When: 7/2019 Where: [facility]  Reason for visit: Gout    2. Have you seen or consulted any other health care providers outside of the [address] [place] [address] since your last visit? Include any pap smears or colon screening. No     Chief Complaint   Patient presents with    Follow-up     Gout. Patient reports no symptoms currently d/c medicaiton.           Visit Vitals  /64 (BP 1 Location: Left arm, BP Patient Position: Sitting)   Pulse 66   Temp 97.7 °F (36.5 °C) (Oral)   Resp 16   Ht 5' 6\" (1.676 m)   Wt 202 lb (91.6 kg)   SpO2 97%   BMI 32.60 kg/m²

## 2019-08-15 NOTE — PROGRESS NOTES
Frank Chino is a 69 y.o. female who presents today with the following:    HPI  Chief Complaint   Patient presents with    Follow-up     Gout. Patient reports no symptoms currently d/c medicaiton. 1. Neuropathic pain    2. Acute gout of right foot, unspecified cause    3. Depression, unspecified depression type    4. Type 2 diabetes with nephropathy ([facility] [address]    5. Encounter for immunization    Patient is here for follow-up of gout, she requested getting a prescription of Uloric and stopping allopurinol as she thinks it is better. She is not currently having any gout attack. Insomnia: She requests another medication for insomnia as she feels she is not sleeping well and stays fatigued throughout the day. Review of Systems   Constitutional: Negative. HENT: Negative. Eyes: Negative. Respiratory: Negative. Cardiovascular: Negative. Gastrointestinal: Negative. Genitourinary: Negative. Musculoskeletal: Positive for back pain, joint pain and myalgias. Skin: Negative. Neurological: Negative. Endo/Heme/Allergies: Negative. Psychiatric/Behavioral: The patient has insomnia. Physical Exam   Constitutional: She is oriented to person, place, and time and well-developed, well-nourished, and in no distress. HENT:   Head: Normocephalic and atraumatic. Right Ear: External ear normal.   Left Ear: External ear normal.   Nose: Nose normal.   Mouth/Throat: No oropharyngeal exudate. Eyes: Conjunctivae are normal.   Neck: Normal range of motion. Neck supple. No thyromegaly present. Cardiovascular: Normal rate and regular rhythm. Pulmonary/Chest: Effort normal and breath sounds normal.   Abdominal: Soft. Bowel sounds are normal. She exhibits no distension. There is no tenderness. Musculoskeletal: Normal range of motion. She exhibits no edema. Lymphadenopathy:     She has no cervical adenopathy. Neurological: She is alert and oriented to person, place, and time.    Skin: Skin is warm and dry. Psychiatric: Mood and affect normal.   Nursing note and vitals reviewed. /64 (BP 1 Location: Left arm, BP Patient Position: Sitting)   Pulse 66   Temp 97.7 °F (36.5 °C) (Oral)   Resp 16   Ht 5' 6\" (1.676 m)   Wt 202 lb (91.6 kg)   SpO2 97%   BMI 32.60 kg/m²     Allergies   Allergen Reactions    Ace Inhibitors Other (comments)     \"Breaks out\"    Advil [Ibuprofen] Hives     Aspirin is a home med    Codeine Rash and Nausea and Vomiting     \"a little rash\"    Penicillin G Angioedema       Current Outpatient Medications   Medication Sig    febuxostat (ULORIC) 40 mg tab tablet Take 1 Tab by mouth daily.  gabapentin (NEURONTIN) 300 mg capsule Take 1 Cap by mouth nightly. Max Daily Amount: 300 mg.  sertraline (ZOLOFT) 100 mg tablet TAKE 1 TABLET BY MOUTH DAILY    varicella-zoster recombinant, PF, (SHINGRIX, PF,) 50 mcg/0.5 mL susr injection 0.5mL by IntraMUSCular route once now and then repeat in 2-6 months    levocetirizine (XYZAL) 5 mg tablet TAKE 1 TABLET BY MOUTH DAILY AS NEEDED FOR ALLERGIES    methocarbamol (ROBAXIN) 500 mg tablet TAKE 1 TABLET THREE TIMES DAILY    LORazepam (ATIVAN) 1 mg tablet Take 1 Tab by mouth daily as needed for Anxiety.  fenofibrate nanocrystallized (TRICOR) 145 mg tablet Take 1 Tab by mouth nightly.  cholecalciferol (VITAMIN D3) 5,000 unit capsule Take 1 Cap by mouth daily.  atorvastatin (LIPITOR) 40 mg tablet Take 1 Tab by mouth nightly.  BD SINGLE USE SWABS REGULAR padm     ACCU-CHEK CHANO CONTROL SOLN soln     ACCU-CHEK CHANO PLUS TEST STRP strip     ACCU-CHEK CHANO PLUS METER misc     insulin lispro (HUMALOG U-100 INSULIN) 100 unit/mL injection Indications: sliding scale    ACCU-CHEK SOFTCLIX LANCETS misc     nebivolol (BYSTOLIC) 20 mg tablet Take 20 mg by mouth daily.  cranberry extract 450 mg tab Take  by mouth daily.  glimepiride (AMARYL) 4 mg tablet Take 4 mg by mouth two (2) times a day.     insulin detemir (LEVEMIR FLEXTOUCH) 100 unit/mL (3 mL) inpn 16 Units by SubCUTAneous route nightly.  levothyroxine (SYNTHROID) 50 mcg tablet Take 50 mcg by mouth Daily (before breakfast).  insulin aspart (NOVOLOG) 100 unit/mL injection 10 Units by SubCUTAneous route daily (with breakfast).  insulin aspart (NOVOLOG) 100 unit/mL injection 15 Units by SubCUTAneous route daily (with dinner).  timolol (TIMOPTIC) 0.5 % ophthalmic solution Administer 1 Drop to both eyes two (2) times a day.  anagrelide (AGRYLIN) 0.5 mg capsule Take 0.5 mg by mouth daily. Pt takes 1 tab (0.5) on tuesdays, thursdays , [address] and sundays.  acetaminophen (TYLENOL) 500 mg tablet Take 500 mg by mouth every six (6) hours as needed for Pain.  colchicine (MITIGARE) 0.6 mg capsule TAKE 2 CAPSULES BY MOUTH ONCE TODAY THEN 1 ONE HOUR LATER TODAY THEN STOP    ferrous sulfate 325 mg (65 mg iron) tablet Take 1 Tab by mouth daily (with breakfast).  magnesium oxide (MAG-OX) 400 mg tablet Take 300 mg by mouth daily. No current facility-administered medications for this visit.         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    Diabetes ([facility] [address]     Dyslipidemia     Hypertension     Hypothyroidism     Ill-defined condition     ACE INHIBITOR ALLERGY WITH COUGH    Ill-defined condition     HYPERTENSIVE CARDIOMYOPATHY WITH MILD DIASTOLIC DYSFUNCTION    Ill-defined condition     glaucoma    PUD (peptic ulcer disease)        Past Surgical History:   Procedure Laterality Date    CARDIAC SURG PROCEDURE UNLIST  1/8/2015    1 stent placed    HX BREAST BIOPSY Right 2009    negative    HX BUNIONECTOMY Left     HX CATARACT REMOVAL Bilateral     HX HAMMER TOE REPAIR Left     HX HEENT      goiter removed    HX LAP CHOLECYSTECTOMY      HX ORTHOPAEDIC         Problem List  Date Reviewed: 5/9/2019          Codes Class Noted    Type 2 diabetes with nephropathy ([facility] ICD-10-CM: E11.21  ICD-9-CM: 250.40, 583.81  3/28/2019        HTN (hypertension) ICD-10-CM: I10  ICD-9-CM: 401.9  10/4/2018        Diabetes ([facility] ICD-10-CM: E11.9  ICD-9-CM: 250.00  10/4/2018        Hyperlipidemia ICD-10-CM: E78.5  ICD-9-CM: 272.4  10/4/2018        Depression ICD-10-CM: F32.9  ICD-9-CM: 097  10/4/2018        Spinal stenosis ICD-10-CM: M48.00  ICD-9-CM: 724.00  5/23/2016               No results found for this visit on 08/15/19. 1. Neuropathic pain    - gabapentin (NEURONTIN) 300 mg capsule; Take 1 Cap by mouth nightly. Max Daily Amount: 300 mg. Dispense: 30 Cap; Refill: 2    2. Acute gout of right foot, unspecified cause  Stop allopurinol and start Uloric  - febuxostat (ULORIC) 40 mg tab tablet; Take 1 Tab by mouth daily. Dispense: 90 Tab; Refill: 1    3. Depression, unspecified depression type    - sertraline (ZOLOFT) 100 mg tablet; TAKE 1 TABLET BY MOUTH DAILY  Dispense: 90 Tab; Refill: 1    4. Type 2 diabetes with nephropathy (HCC)    - [place] CALC BMI ABV UP BISMARK F/U  - HEMOGLOBIN A1C WITH EAG  - MICROALBUMIN, UR, RAND W/ MICROALB/CREAT RATIO    5. Encounter for immunization    - varicella-zoster recombinant, PF, (SHINGRIX, PF,) 50 mcg/0.5 mL susr injection; 0.5mL by IntraMUSCular route once now and then repeat in 2-6 months  Dispense: 0.5 mL; Refill: 1        Follow-up and Dispositions    · Return in about 3 months (around 11/15/2019) for follow up, med check. I have discussed the diagnosis with the patient and the intended plan as seen in the above orders. The patient has received an after-visit summary and questions were answered concerning future plans. I have discussed medication side effects and warnings with the patient as well. The patient agrees and understands above plan. Giulia Thurston MD                Discussed the patient's BMI with her.   The BMI follow up plan is as follows: dietary management education, guidance, and counseling  encourage exercise  monitor weight  prescribed dietary intake    An After Visit Summary was printed and given to the patient. The patient's abnormal BMI was reviewed and deemed target BMI for the patient. An After Visit Summary was provided to the patient and/or caregiver.

## 2019-08-15 NOTE — PATIENT INSTRUCTIONS
Body Mass Index: Care Instructions Your Care Instructions Body mass index (BMI) can help you see if your weight is raising your risk for health problems. It uses a formula to compare how much you weigh with how tall you are. · A BMI lower than 18.5 is considered underweight. · A BMI between 18.5 and 24.9 is considered healthy. · A BMI between 25 and 29.9 is considered overweight. A BMI of 30 or higher is considered obese. If your BMI is in the normal range, it means that you have a lower risk for weight-related health problems. If your BMI is in the overweight or obese range, you may be at increased risk for weight-related health problems, such as high blood pressure, heart disease, stroke, arthritis or joint pain, and diabetes. If your BMI is in the underweight range, you may be at increased risk for health problems such as fatigue, lower protection (immunity) against illness, muscle loss, bone loss, hair loss, and hormone problems. BMI is just one measure of your risk for weight-related health problems. You may be at higher risk for health problems if you are not active, you eat an unhealthy diet, or you drink too much alcohol or use tobacco products. Follow-up care is a key part of your treatment and safety. Be sure to make and go to all appointments, and call your doctor if you are having problems. It's also a good idea to know your test results and keep a list of the medicines you take. How can you care for yourself at home? · Practice healthy eating habits. This includes eating plenty of fruits, vegetables, whole grains, lean protein, and low-fat dairy. · If your doctor recommends it, get more exercise. Walking is a good choice. Bit by bit, increase the amount you walk every day. Try for at least 30 minutes on most days of the week. · Do not smoke. Smoking can increase your risk for health problems.  If you need help quitting, talk to your doctor about stop-smoking programs and medicines. These can increase your chances of quitting for good. · Limit alcohol to 2 drinks a day for men and 1 drink a day for women. Too much alcohol can cause health problems. If you have a BMI higher than 25 · Your doctor may do other tests to check your risk for weight-related health problems. This may include measuring the distance around your waist. A waist measurement of more than 40 inches in men or 35 inches in women can increase the risk of weight-related health problems. · Talk with your doctor about steps you can take to stay healthy or improve your health. You may need to make lifestyle changes to lose weight and stay healthy, such as changing your diet and getting regular exercise. If you have a BMI lower than 18.5 · Your doctor may do other tests to check your risk for health problems. · Talk with your doctor about steps you can take to stay healthy or improve your health. You may need to make lifestyle changes to gain or maintain weight and stay healthy, such as getting more healthy foods in your diet and doing exercises to build muscle. Where can you learn more? Go to http://solomon-adams.info/. Enter S176 in the search box to learn more about \"Body Mass Index: Care Instructions. \" Current as of: October 13, 2016 Content Version: 11.4 © 0813-5152 [employer]. Care instructions adapted under license by [employer] (which disclaims liability or warranty for this information). If you have questions about a medical condition or this instruction, always ask your healthcare professional. [address] any warranty or liability for your use of this information. Body Mass Index: Care Instructions Your Care Instructions Body mass index (BMI) can help you see if your weight is raising your risk for health problems. It uses a formula to compare how much you weigh with how tall you are. · A BMI lower than 18.5 is considered underweight. · A BMI between 18.5 and 24.9 is considered healthy. · A BMI between 25 and 29.9 is considered overweight. A BMI of 30 or higher is considered obese. If your BMI is in the normal range, it means that you have a lower risk for weight-related health problems. If your BMI is in the overweight or obese range, you may be at increased risk for weight-related health problems, such as high blood pressure, heart disease, stroke, arthritis or joint pain, and diabetes. If your BMI is in the underweight range, you may be at increased risk for health problems such as fatigue, lower protection (immunity) against illness, muscle loss, bone loss, hair loss, and hormone problems. BMI is just one measure of your risk for weight-related health problems. You may be at higher risk for health problems if you are not active, you eat an unhealthy diet, or you drink too much alcohol or use tobacco products. Follow-up care is a key part of your treatment and safety. Be sure to make and go to all appointments, and call your doctor if you are having problems. It's also a good idea to know your test results and keep a list of the medicines you take. How can you care for yourself at home? · Practice healthy eating habits. This includes eating plenty of fruits, vegetables, whole grains, lean protein, and low-fat dairy. · If your doctor recommends it, get more exercise. Walking is a good choice. Bit by bit, increase the amount you walk every day. Try for at least 30 minutes on most days of the week. · Do not smoke. Smoking can increase your risk for health problems. If you need help quitting, talk to your doctor about stop-smoking programs and medicines. These can increase your chances of quitting for good. · Limit alcohol to 2 drinks a day for men and 1 drink a day for women. Too much alcohol can cause health problems. If you have a BMI higher than 25 · Your doctor may do other tests to check your risk for weight-related health problems. This may include measuring the distance around your waist. A waist measurement of more than 40 inches in men or 35 inches in women can increase the risk of weight-related health problems. · Talk with your doctor about steps you can take to stay healthy or improve your health. You may need to make lifestyle changes to lose weight and stay healthy, such as changing your diet and getting regular exercise. If you have a BMI lower than 18.5 · Your doctor may do other tests to check your risk for health problems. · Talk with your doctor about steps you can take to stay healthy or improve your health. You may need to make lifestyle changes to gain or maintain weight and stay healthy, such as getting more healthy foods in your diet and doing exercises to build muscle. Where can you learn more? Go to http://solomon-adams.info/. Enter S176 in the search box to learn more about \"Body Mass Index: Care Instructions. \" Current as of: October 13, 2016 Content Version: 11.4 © 9567-2364 [employer]. Care instructions adapted under license by [employer] (which disclaims liability or warranty for this information). If you have questions about a medical condition or this instruction, always ask your healthcare professional. [address] any warranty or liability for your use of this information.

## 2019-08-16 LAB
ALBUMIN/CREAT UR: 5.8 MG/G CREAT (ref 0–30)
CREAT UR-MCNC: 121.5 MG/DL
EST. AVERAGE GLUCOSE BLD GHB EST-MCNC: 183 MG/DL
HBA1C MFR BLD: 8 % (ref 4.8–5.6)
MICROALBUMIN UR-MCNC: 7.1 UG/ML

## 2019-08-22 DIAGNOSIS — E55.9 VITAMIN D DEFICIENCY: ICD-10-CM

## 2019-08-26 RX ORDER — ATORVASTATIN CALCIUM 40 MG/1
TABLET, FILM COATED ORAL
Qty: 90 TAB | Refills: 1 | Status: SHIPPED | OUTPATIENT
Start: 2019-08-26 | End: 2020-01-13

## 2019-08-26 RX ORDER — CHOLECALCIFEROL (VITAMIN D3) 125 MCG
CAPSULE ORAL
Qty: 90 CAP | Refills: 1 | Status: SHIPPED | OUTPATIENT
Start: 2019-08-26 | End: 2020-01-13

## 2019-08-26 RX ORDER — METHOCARBAMOL 500 MG/1
TABLET, FILM COATED ORAL
Qty: 90 TAB | Refills: 0 | Status: SHIPPED | OUTPATIENT
Start: 2019-08-26 | End: 2020-02-28 | Stop reason: ALTCHOICE

## 2019-09-04 ENCOUNTER — OFFICE VISIT (OUTPATIENT)
Dept: FAMILY MEDICINE CLINIC | Age: 79
End: 2019-09-04

## 2019-09-04 VITALS
OXYGEN SATURATION: 94 % | HEART RATE: 86 BPM | DIASTOLIC BLOOD PRESSURE: 65 MMHG | HEIGHT: 66 IN | SYSTOLIC BLOOD PRESSURE: 110 MMHG | WEIGHT: 192 LBS | RESPIRATION RATE: 16 BRPM | BODY MASS INDEX: 30.86 KG/M2 | TEMPERATURE: 97.2 F

## 2019-09-04 DIAGNOSIS — J04.0 LARYNGITIS: ICD-10-CM

## 2019-09-04 DIAGNOSIS — J18.9 PNEUMONIA DUE TO INFECTIOUS ORGANISM, UNSPECIFIED LATERALITY, UNSPECIFIED PART OF LUNG: Primary | ICD-10-CM

## 2019-09-04 RX ORDER — DOXYCYCLINE 100 MG/1
1 CAPSULE ORAL 2 TIMES DAILY
COMMUNITY
Start: 2019-09-01 | End: 2019-09-11

## 2019-09-04 NOTE — PROGRESS NOTES
Andie Marcano  69 y.o. female  1940  [id number]    [facility] MEDICINE  Progress Note     Encounter Date: 9/4/2019    Assessment and Plan:     Encounter Diagnoses     ICD-10-CM ICD-9-CM   1. Pneumonia due to infectious organism, unspecified laterality, unspecified part of lung J18.9 136.9     484.8   2. Laryngitis J04.0 464.00       1. Pneumonia due to infectious organism, unspecified laterality, unspecified part of lung  2. Laryngitis  Patient advised to continue taking abx and add mucinex. Advised continue supportive measures. - XR CHEST PA LAT; Future  - guaiFENesin ER (MUCINEX) 1,200 mg Ta12 ER tablet; Take 1 Tab by mouth two (2) times a day for 7 days. Dispense: 14 Tab; Refill: 0          I have discussed the diagnosis with the patient and the intended plan as seen in the above orders. she has expressed understanding. The patient has received an after-visit summary and questions were answered concerning future plans. I have discussed medication side effects and warnings with the patient as well. Electronically Signed: Sri Hale MD    Current Medications after this visit     Current Outpatient Medications   Medication Sig    guaiFENesin ER (MUCINEX) 1,200 mg Ta12 ER tablet Take 1 Tab by mouth two (2) times a day for 7 days.  cholecalciferol (VITAMIN D3) 5,000 unit capsule TAKE 1 CAPSULE EVERY DAY    methocarbamol (ROBAXIN) 500 mg tablet TAKE 1 TABLET THREE TIMES DAILY    levocetirizine (XYZAL) 5 mg tablet TAKE 1 TABLET BY MOUTH DAILY AS NEEDED FOR ALLERGIES    colchicine (MITIGARE) 0.6 mg capsule TAKE 2 CAPSULES BY MOUTH ONCE TODAY THEN 1 ONE HOUR LATER TODAY THEN STOP    LORazepam (ATIVAN) 1 mg tablet Take 1 Tab by mouth daily as needed for Anxiety.  fenofibrate nanocrystallized (TRICOR) 145 mg tablet Take 1 Tab by mouth nightly.  ferrous sulfate 325 mg (65 mg iron) tablet Take 1 Tab by mouth daily (with breakfast).     BD SINGLE USE SWABS REGULAR padm     insulin lispro (HUMALOG U-100 INSULIN) 100 unit/mL injection Indications: sliding scale    nebivolol (BYSTOLIC) 20 mg tablet Take 20 mg by mouth daily.  glimepiride (AMARYL) 4 mg tablet Take 4 mg by mouth two (2) times a day.  insulin detemir (LEVEMIR FLEXTOUCH) 100 unit/mL (3 mL) inpn 16 Units by SubCUTAneous route nightly.  levothyroxine (SYNTHROID) 50 mcg tablet Take 50 mcg by mouth Daily (before breakfast).  magnesium oxide (MAG-OX) 400 mg tablet Take 300 mg by mouth daily.  insulin aspart (NOVOLOG) 100 unit/mL injection 10 Units by SubCUTAneous route daily (with breakfast).  insulin aspart (NOVOLOG) 100 unit/mL injection 15 Units by SubCUTAneous route daily (with dinner).  timolol (TIMOPTIC) 0.5 % ophthalmic solution Administer 1 Drop to both eyes two (2) times a day.  acetaminophen (TYLENOL) 500 mg tablet Take 500 mg by mouth every six (6) hours as needed for Pain.  doxycycline (VIBRAMYCIN) 100 mg capsule Take 1 Cap by mouth two (2) times a day.  atorvastatin (LIPITOR) 40 mg tablet TAKE 1 TABLET EVERY NIGHT    febuxostat (ULORIC) 40 mg tab tablet Take 1 Tab by mouth daily.  gabapentin (NEURONTIN) 300 mg capsule Take 1 Cap by mouth nightly. Max Daily Amount: 300 mg.  sertraline (ZOLOFT) 100 mg tablet TAKE 1 TABLET BY MOUTH DAILY    varicella-zoster recombinant, PF, (SHINGRIX, PF,) 50 mcg/0.5 mL susr injection 0.5mL by IntraMUSCular route once now and then repeat in 2-6 months    ACCU-CHEK CHANO CONTROL SOLN soln     ACCU-CHEK CHANO PLUS TEST STRP strip     ACCU-CHEK CHANO PLUS METER [facility]     ACCU-CHEK SOFTCLIX LANCETS [facility]     cranberry extract 450 mg tab Take  by mouth daily.  anagrelide (AGRYLIN) 0.5 mg capsule Take 0.5 mg by mouth daily. Pt takes 1 tab (0.5) on tuesdays, thursdays , [address] and sundays. No current facility-administered medications for this visit.       There are no discontinued medications. ~~~~~~~~~~~~~~~~~~~~~~~~~~~~~~~~~~~~~~~~~~~~~~    Chief Complaint   Patient presents with    Cough     Patient states that cough started last week       History provided by patient  History of Present Illness   Terra Moralez is a 69 y.o. female who presents to clinic today for:    Cough/Pneumonia  Patient present with cc of pneumonia. Patient reports that she started to cough 1 week ago. She had gone to [facility] on 9/1/2019 and was diagnosed with PNA. Labs were notable for Hgb 11.7 WBC was normal and CXR report not available. Patient was discharged with Doxycycline 100 mg BID. She reports that she is starting to fel better and cough is improving. Review of Systems   Review of Systems   Constitutional:        See HPI for pertinent review of systems        Vitals/Objective:     Vitals:    09/04/19 1047   BP: 110/65   Pulse: 86   Resp: 16   Temp: 97.2 °F (36.2 °C)   TempSrc: Oral   SpO2: 94%   Weight: 192 lb (87.1 kg)   Height: 5' 6\" (1.676 m)     Body mass index is 30.99 kg/m². Wt Readings from Last 3 Encounters:   09/04/19 192 lb (87.1 kg)   08/15/19 202 lb (91.6 kg)   05/16/19 194 lb (88 kg)       Physical Exam   Constitutional: She is oriented to person, place, and time. She appears well-developed and well-nourished. HENT:   Head: Normocephalic and atraumatic. Eyes: Right eye exhibits no discharge. Left eye exhibits no discharge. Cardiovascular: Normal rate and regular rhythm. No murmur heard. Pulmonary/Chest: Effort normal and breath sounds normal. No stridor. No respiratory distress. She has no wheezes. She has no rales. She exhibits no tenderness. Musculoskeletal: She exhibits no edema. Neurological: She is alert and oriented to person, place, and time. No results found for this or any previous visit (from the past 24 hour(s)). Disposition     Follow-up and Dispositions  ·   Return in about 1 week (around 9/11/2019), or if symptoms worsen or fail to improve. Future Appointments   Date Time Provider Vy Alondra   11/14/2019  9:15 AM Jayde Torrez  [address]       History   Patient's past medical, surgical and family histories were reviewed and updated.     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    Diabetes ([facility] [address]     Dyslipidemia     Hypertension     Hypothyroidism     Ill-defined condition     ACE INHIBITOR ALLERGY WITH COUGH    Ill-defined condition     HYPERTENSIVE CARDIOMYOPATHY WITH MILD DIASTOLIC DYSFUNCTION    Ill-defined condition     glaucoma    PUD (peptic ulcer disease)      Past Surgical History:   Procedure Laterality Date    CARDIAC SURG PROCEDURE UNLIST  1/8/2015    1 stent placed    HX BREAST BIOPSY Right 2009    negative    HX BUNIONECTOMY Left     HX CATARACT REMOVAL Bilateral     HX HAMMER TOE REPAIR Left     HX HEENT      goiter removed    HX LAP CHOLECYSTECTOMY      HX ORTHOPAEDIC       Family History   Problem Relation Age of Onset    Diabetes Mother     Heart Disease Father     Diabetes Brother     Heart Disease Brother     Diabetes Brother     Heart Disease Brother     Anesth Problems Neg Hx      Social History     Tobacco Use    Smoking status: Never Smoker    Smokeless tobacco: Never Used   Substance Use Topics    Alcohol use: No    Drug use: No       Allergies     Allergies   Allergen Reactions    Ace Inhibitors Other (comments)     \"Breaks out\"    Advil [Ibuprofen] Hives     Aspirin is a home med    Codeine Rash and Nausea and Vomiting     \"a little rash\"    Penicillin G Angioedema

## 2019-09-04 NOTE — PROGRESS NOTES
Identified pt with two pt identifiers(name and ). Reviewed record in preparation for visit and have obtained necessary documentation. Chief Complaint   Patient presents with    Cough     Patient states that cough started last week        Health Maintenance Due   Topic    EYE EXAM RETINAL OR DILATED     Shingrix Vaccine Age 49> (1 of 2)       Coordination of Care Questionnaire:  :   1) Have you been to an emergency room, urgent care, or hospitalized since your last visit? If yes, where when, and reason for visit? yes   [facility]: Cough    2. Have seen or consulted any other health care provider since your last visit? If yes, where when, and reason for visit? NO        Patient is accompanied by self I have received verbal consent from Britt Gonzalez to discuss any/all medical information while they are present in the room.

## 2019-09-11 ENCOUNTER — HOME HEALTH ADMISSION (OUTPATIENT)
Dept: HOME HEALTH SERVICES | Facility: HOME HEALTH | Age: 79
End: 2019-09-11

## 2019-09-11 ENCOUNTER — OFFICE VISIT (OUTPATIENT)
Dept: FAMILY MEDICINE CLINIC | Age: 79
End: 2019-09-11

## 2019-09-11 VITALS
WEIGHT: 183 LBS | SYSTOLIC BLOOD PRESSURE: 97 MMHG | RESPIRATION RATE: 24 BRPM | OXYGEN SATURATION: 95 % | TEMPERATURE: 95 F | DIASTOLIC BLOOD PRESSURE: 57 MMHG | HEIGHT: 66 IN | BODY MASS INDEX: 29.41 KG/M2 | HEART RATE: 66 BPM

## 2019-09-11 DIAGNOSIS — R23.1 COLD AND CLAMMY SKIN: ICD-10-CM

## 2019-09-11 DIAGNOSIS — R68.83 CHILLS: ICD-10-CM

## 2019-09-11 DIAGNOSIS — Z78.9 DECREASED ACTIVITIES OF DAILY LIVING (ADL): ICD-10-CM

## 2019-09-11 DIAGNOSIS — D47.1 MYELOPROLIFERATIVE DISORDER (HCC): ICD-10-CM

## 2019-09-11 DIAGNOSIS — R20.9 COLD AND CLAMMY SKIN: ICD-10-CM

## 2019-09-11 DIAGNOSIS — R53.83 FATIGUE, UNSPECIFIED TYPE: ICD-10-CM

## 2019-09-11 DIAGNOSIS — I95.9 HYPOTENSION, UNSPECIFIED HYPOTENSION TYPE: ICD-10-CM

## 2019-09-11 DIAGNOSIS — J18.9 PNEUMONIA DUE TO INFECTIOUS ORGANISM, UNSPECIFIED LATERALITY, UNSPECIFIED PART OF LUNG: Primary | ICD-10-CM

## 2019-09-11 RX ORDER — METHYLPREDNISOLONE 4 MG/1
TABLET ORAL
Qty: 1 DOSE PACK | Refills: 0 | Status: SHIPPED | OUTPATIENT
Start: 2019-09-11 | End: 2019-11-07 | Stop reason: ALTCHOICE

## 2019-09-11 RX ORDER — ALBUTEROL SULFATE 90 UG/1
1 AEROSOL, METERED RESPIRATORY (INHALATION)
Qty: 1 INHALER | Refills: 1 | Status: SHIPPED | OUTPATIENT
Start: 2019-09-11 | End: 2019-09-30

## 2019-09-11 RX ORDER — DOXYCYCLINE 100 MG/1
100 TABLET ORAL 2 TIMES DAILY
Qty: 20 TAB | Refills: 0 | Status: SHIPPED | OUTPATIENT
Start: 2019-09-11 | End: 2019-09-21

## 2019-09-11 NOTE — PROGRESS NOTES
Frank Chino is a 69 y.o. female who presents today with the following:    HPI  Chief Complaint   Patient presents with    Cough     Pneumonia Follow Up     Still not feeling well       1. Pneumonia due to infectious organism, unspecified laterality, unspecified part of lung    2. Decreased activities of daily living (ADL)    3. Chills    4. Cold and clammy skin    5. Hypotension, unspecified hypotension type    6. Fatigue, unspecified type    7. Myeloproliferative disorder [facility])    Patient is here for follow-up of [facility] which [facility]  She was prescribed doxycycline for 10 days. She followed up with Dr. Lucy Lovelace on 9/4/2019 and reported her symptoms improving. She was advised to continue doxycycline and Mucinex was added. Today patient feels her symptoms are not better. Associated symptoms include chills, cold and clammy skin. Patient also reports she is unable to do her ADLs independently. She reports she lives alone and some of her acquaintances have been visiting her to check on her, they have advised her to go to the hospital and get admitted as she is not feeling better since past 2 weeks. Patient also reports weight loss which is unintentional.  She feels fatigued and depressed. After asking her repeatedly she agreed to home health referral, She is unsure if she is willing to go to the ER right now. Patient agreed to outpatient therapy including Medrol Dosepak and albuterol inhaler. After leaving she felt her symptoms got worse and she changed her mind and requested that rescue squad be called for her to be taken to the hospital.    Review of Systems   Constitutional: Positive for chills, malaise/fatigue and weight loss. HENT: Negative. Eyes: Negative. Respiratory: Positive for cough and shortness of breath. Cardiovascular: Negative. Gastrointestinal: Negative. Genitourinary: Negative. Musculoskeletal: Negative. Skin: Negative. Neurological: Negative. Endo/Heme/Allergies: Negative. Psychiatric/Behavioral: Negative. Physical Exam   Constitutional: She is oriented to person, place, and time and well-developed, well-nourished, and in no distress. HENT:   Head: Normocephalic and atraumatic. Right Ear: External ear normal.   Left Ear: External ear normal.   Nose: Nose normal.   Mouth/Throat: No oropharyngeal exudate. Eyes: Conjunctivae are normal.   Neck: Normal range of motion. Neck supple. No thyromegaly present. Cardiovascular: Normal rate and regular rhythm. Pulmonary/Chest: Effort normal. She has decreased breath sounds in the right lower field and the left lower field. She has rales in the left lower field. Abdominal: Soft. Bowel sounds are normal. She exhibits no distension. There is no tenderness. Musculoskeletal: Normal range of motion. She exhibits no edema. Lymphadenopathy:     She has no cervical adenopathy. Neurological: She is alert and oriented to person, place, and time. Skin: Skin is warm and dry. Psychiatric: Mood and affect normal.   Nursing note and vitals reviewed. BP 97/57   Pulse 66   Temp 95 °F (35 °C) (Oral)   Resp 24   Ht 5' 6\" (1.676 m)   Wt 183 lb (83 kg)   SpO2 95%   BMI 29.54 kg/m²     Allergies   Allergen Reactions    Ace Inhibitors Other (comments)     \"Breaks out\"    Advil [Ibuprofen] Hives     Aspirin is a home med    Codeine Rash and Nausea and Vomiting     \"a little rash\"    Penicillin G Angioedema       Current Outpatient Medications   Medication Sig    methylPREDNISolone (MEDROL DOSEPACK) 4 mg tablet USE AS DIRECTED ON THE PACK    albuterol (PROVENTIL HFA, VENTOLIN HFA, PROAIR HFA) 90 mcg/actuation inhaler Take 1 Puff by inhalation every four (4) hours as needed for Wheezing or Shortness of Breath.  doxycycline (ADOXA) 100 mg tablet Take 1 Tab by mouth two (2) times a day for 10 days.     doxycycline (VIBRAMYCIN) 100 mg capsule Take 1 Cap by mouth two (2) times a day.  guaiFENesin ER (MUCINEX) 1,200 mg Ta12 ER tablet Take 1 Tab by mouth two (2) times a day for 7 days.  atorvastatin (LIPITOR) 40 mg tablet TAKE 1 TABLET EVERY NIGHT    cholecalciferol (VITAMIN D3) 5,000 unit capsule TAKE 1 CAPSULE EVERY DAY    methocarbamol (ROBAXIN) 500 mg tablet TAKE 1 TABLET THREE TIMES DAILY    levocetirizine (XYZAL) 5 mg tablet TAKE 1 TABLET BY MOUTH DAILY AS NEEDED FOR ALLERGIES    febuxostat (ULORIC) 40 mg tab tablet Take 1 Tab by mouth daily.  gabapentin (NEURONTIN) 300 mg capsule Take 1 Cap by mouth nightly. Max Daily Amount: 300 mg.  sertraline (ZOLOFT) 100 mg tablet TAKE 1 TABLET BY MOUTH DAILY    colchicine (MITIGARE) 0.6 mg capsule TAKE 2 CAPSULES BY MOUTH ONCE TODAY THEN 1 ONE HOUR LATER TODAY THEN STOP    LORazepam (ATIVAN) 1 mg tablet Take 1 Tab by mouth daily as needed for Anxiety.  fenofibrate nanocrystallized (TRICOR) 145 mg tablet Take 1 Tab by mouth nightly.  ferrous sulfate 325 mg (65 mg iron) tablet Take 1 Tab by mouth daily (with breakfast).  BD SINGLE USE SWABS REGULAR padm     ACCU-CHEK CHANO CONTROL SOLN soln     ACCU-CHEK CHANO PLUS TEST STRP strip     ACCU-CHEK CHANO PLUS METER misc     insulin lispro (HUMALOG U-100 INSULIN) 100 unit/mL injection Indications: sliding scale    ACCU-CHEK SOFTCLIX LANCETS misc     nebivolol (BYSTOLIC) 20 mg tablet Take 20 mg by mouth daily.  cranberry extract 450 mg tab Take  by mouth daily.  glimepiride (AMARYL) 4 mg tablet Take 4 mg by mouth two (2) times a day.  insulin detemir (LEVEMIR FLEXTOUCH) 100 unit/mL (3 mL) inpn 16 Units by SubCUTAneous route nightly.  levothyroxine (SYNTHROID) 50 mcg tablet Take 50 mcg by mouth Daily (before breakfast).  magnesium oxide (MAG-OX) 400 mg tablet Take 300 mg by mouth daily.  insulin aspart (NOVOLOG) 100 unit/mL injection 10 Units by SubCUTAneous route daily (with breakfast).     insulin aspart (NOVOLOG) 100 unit/mL injection 15 Units by SubCUTAneous route daily (with dinner).  timolol (TIMOPTIC) 0.5 % ophthalmic solution Administer 1 Drop to both eyes two (2) times a day.  anagrelide (AGRYLIN) 0.5 mg capsule Take 0.5 mg by mouth daily. Pt takes 1 tab (0.5) on tuesdays, thursdays , [address] and sundays.  acetaminophen (TYLENOL) 500 mg tablet Take 500 mg by mouth every six (6) hours as needed for Pain.  varicella-zoster recombinant, PF, (SHINGRIX, PF,) 50 mcg/0.5 mL susr injection 0.5mL by IntraMUSCular route once now and then repeat in 2-6 months     No current facility-administered medications for this visit.         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    Diabetes ([facility]     Dyslipidemia     Hypertension     Hypothyroidism     Ill-defined condition     ACE INHIBITOR ALLERGY WITH COUGH    Ill-defined condition     HYPERTENSIVE CARDIOMYOPATHY WITH MILD DIASTOLIC DYSFUNCTION    Ill-defined condition     glaucoma    PUD (peptic ulcer disease)        Past Surgical History:   Procedure Laterality Date    CARDIAC SURG PROCEDURE UNLIST  1/8/2015    1 stent placed    HX BREAST BIOPSY Right 2009    negative    HX BUNIONECTOMY Left     HX CATARACT REMOVAL Bilateral     HX HAMMER TOE REPAIR Left     HX HEENT      goiter removed    HX LAP CHOLECYSTECTOMY      HX ORTHOPAEDIC         Problem List  Date Reviewed: 9/4/2019          Codes Class Noted    Type 2 diabetes with nephropathy ([facility] ICD-10-CM: E11.21  ICD-9-CM: 250.40, 583.81  3/28/2019        HTN (hypertension) ICD-10-CM: I10  ICD-9-CM: 401.9  10/4/2018        Diabetes ([facility] ICD-10-CM: E11.9  ICD-9-CM: 250.00  10/4/2018        Hyperlipidemia ICD-10-CM: E78.5  ICD-9-CM: 272.4  10/4/2018        Depression ICD-10-CM: F32.9  ICD-9-CM: 311  10/4/2018        Spinal stenosis ICD-10-CM: M48.00  ICD-9-CM: 724.00 5/23/2016               No results found for this visit on 09/11/19. 1. Pneumonia due to infectious organism, unspecified laterality, unspecified part of lung    - methylPREDNISolone (MEDROL DOSEPACK) 4 mg tablet; USE AS DIRECTED ON THE PACK  Dispense: 1 Dose Pack; Refill: 0  - XR CHEST PA LAT; Future  - albuterol (PROVENTIL HFA, VENTOLIN HFA, PROAIR HFA) 90 mcg/actuation inhaler; Take 1 Puff by inhalation every four (4) hours as needed for Wheezing or Shortness of Breath. Dispense: 1 Inhaler; Refill: 1  - REFERRAL TO HOME HEALTH  - doxycycline (ADOXA) 100 mg tablet; Take 1 Tab by mouth two (2) times a day for 10 days. Dispense: 20 Tab; Refill: 0    2. Decreased activities of daily living (ADL)  Patient request home health to be ordered    3. Chills  Referral to ER    4. Cold and clammy skin  Referral to ER. After deciding that patient wanted outpatient therapy instead of going to the ER I prescribed outpatient medications and home health. At time of checkout in  patient changed her mind and felt that she was feeling worse and requested rescue squad to be called for the patient to be taken to the hospital.    5. Hypotension, unspecified hypotension type  Referral to ER  - [address] [place] [address]    6. Fatigue, unspecified type  Referral to the ER  - [address] [place] [address]    7. Myeloproliferative disorder ([facility]    - [address] [place] [address]        Follow-up and Dispositions    · Return in about 2 days (around 9/13/2019) for follow up. I have discussed the diagnosis with the patient and the intended plan as seen in the above orders. The patient has received an after-visit summary and questions were answered concerning future plans. I have discussed medication side effects and warnings with the patient as well. The patient agrees and understands above plan.            Betty Souza MD

## 2019-09-11 NOTE — PROGRESS NOTES
Patient stated name &     Chief Complaint   Patient presents with    Cough     Pneumonia Follow Up     Still not feeling well        Health Maintenance Due   Topic    EYE EXAM RETINAL OR DILATED     Shingrix Vaccine Age 50> (1 of 2)       Wt Readings from Last 3 Encounters:   19 183 lb (83 kg)   19 192 lb (87.1 kg)   08/15/19 202 lb (91.6 kg)     Temp Readings from Last 3 Encounters:   19 95 °F (35 °C) (Oral)   19 97.2 °F (36.2 °C) (Oral)   08/15/19 97.7 °F (36.5 °C) (Oral)     BP Readings from Last 3 Encounters:   19 97/57   19 110/65   08/15/19 104/64     Pulse Readings from Last 3 Encounters:   19 66   19 86   08/15/19 66         Learning Assessment:  :     Learning Assessment 10/25/2018   PRIMARY LEARNER Patient   HIGHEST LEVEL OF EDUCATION - PRIMARY LEARNER  DID NOT GRADUATE HIGH SCHOOL   PRIMARY LANGUAGE ENGLISH   LEARNER PREFERENCE PRIMARY READING   ANSWERED BY self   RELATIONSHIP SELF       Depression Screening:  :     3 most recent PHQ Screens 2019   Little interest or pleasure in doing things Not at all   Feeling down, depressed, irritable, or hopeless Not at all   Total Score PHQ 2 0       Fall Risk Assessment:  :     Fall Risk Assessment, last 12 mths 8/15/2019   Able to walk? Yes   Fall in past 12 months? Yes   Fall with injury? Yes   Number of falls in past 12 months 1   Fall Risk Score 2       Abuse Screening:  :     Abuse Screening Questionnaire 2019   Do you ever feel afraid of your partner? N   Are you in a relationship with someone who physically or mentally threatens you? N   Is it safe for you to go home? Y       Coordination of Care Questionnaire:  :     1) Have you been to an emergency room, urgent care clinic since your last visit? No    Hospitalized since your last visit? No             2) Have you seen or consulted any other health care providers outside of [address] [place] [address] since your last visit?  No  (Include any pap smears or colon screenings in this section.)    Patient is accompanied by self I have received verbal consent from Pradeep Llanos to discuss any/all medical information while they are present in the room.

## 2019-09-12 ENCOUNTER — HOME CARE VISIT (OUTPATIENT)
Dept: HOME HEALTH SERVICES | Facility: HOME HEALTH | Age: 79
End: 2019-09-12

## 2019-09-13 ENCOUNTER — HOME CARE VISIT (OUTPATIENT)
Dept: SCHEDULING | Facility: HOME HEALTH | Age: 79
End: 2019-09-13

## 2019-09-17 ENCOUNTER — TELEPHONE (OUTPATIENT)
Dept: FAMILY MEDICINE CLINIC | Age: 79
End: 2019-09-17

## 2019-09-17 ENCOUNTER — PATIENT OUTREACH (OUTPATIENT)
Dept: FAMILY MEDICINE CLINIC | Age: 79
End: 2019-09-17

## 2019-09-17 NOTE — TELEPHONE ENCOUNTER
[address] to see if Dr. Isidra Jay will follow patient with home health.     Patient should be getting discharge this week with skill nursing, pt/ot    Please advises

## 2019-09-17 NOTE — PROGRESS NOTES
Pt's daughter Emma Munoz listed on HIPAA returned this CTN call, states that she lives in [place]. Sammi Martin explained that her mother Ms. Ma adopted her to another family when she was 4 days old,  Emma Munoz lives in [place] with the family that adopted her)  Pt had called her last night from a neighbour's house to say both her cell phone and home phone have not been working, that she has discharged from the hospital.  Per Emma Munoz pt does have supportive family in [place] that visit pt, her one daughter in law is  a nurse. Emma Munoz has contacted multiple family members to request them go to pt's house to check on her. 9/17/19 4:30 -This CTN contacted Lisa Davila from [facility], advised that pt has been discharged on 9/16/19 however this CTN has not been able to make contact with the pt. Plan for skilled Nursing, SW and PT,   Will advise Dr. Valencia Muhammad tomorrow morning to confirm she will be following this pt for home health services.

## 2019-09-18 ENCOUNTER — PATIENT OUTREACH (OUTPATIENT)
Dept: FAMILY MEDICINE CLINIC | Age: 79
End: 2019-09-18

## 2019-09-18 NOTE — TELEPHONE ENCOUNTER
Called [phone] [facility] that Dr. Stacie Bone will sign order from [address]

## 2019-09-18 NOTE — PROGRESS NOTES
This CTN received a VM from [place] 9/17 at [phone] , to advise Rubi Ascencio (pt's stepson) was at her house now she is fine, he phone is not working however he has contacted the provided to advised and should be there for service tonight. 1000-CTN contacted Samm Yepez from [place] [address] that Dr. Rae Jay will be following this pt for her Home Health needs. Per Samm Yepez they will see Pt either today or tomorrow to start care. Samm Yepez is aware that pt's phone is not working. 1014-Contacted [place] this morning, advised that a Nurse from [facility] would be there to start care either today or tomorrow. Per [place] pt is now able to call out on her cell phone however she is still unable to receive calls. Pt has advised [place] she will call her later today for an update on how she is feeling. CTN requested [place] give pt my contact information and have her call me ASAP.

## 2019-09-19 ENCOUNTER — PATIENT OUTREACH (OUTPATIENT)
Dept: FAMILY MEDICINE CLINIC | Age: 79
End: 2019-09-19

## 2019-09-20 ENCOUNTER — PATIENT OUTREACH (OUTPATIENT)
Dept: FAMILY MEDICINE CLINIC | Age: 79
End: 2019-09-20

## 2019-09-20 NOTE — PROGRESS NOTES
1621-Pauline RN returned this CTN call, she visited pt today states pt is slightly sob with o2 sats in high 90's, she went over all of pt's medications, the pt has all or her medications and is compliant with taking them. Aniceto Hodgkins has requested order for SN to see pt this weekend. She also will need an order for CBC draw on 9/23 , order to see pt twice a week for 3 weeks, and once a week for 4 weeks. 1645-CTN spoke with Yuko Cabrera supervisor at [facility], she will fax over an order for Dr. Nic Cole to sign so pt can be seen this weekend, will have Dr. Jose R Moon complete other orders on Monday. I have still not been able to reach this pt due to pts phone not working. [address] with Aniceto Hodgkins pt's daughter, she is making arrangement for pt to get to her appointments through [facility]. She has booked a f/u appointment with pts cardiologist on 9/26,  I will get a PCP f/u booked tomorrow morning, will also book a Pulmonology f/u appointment. Per Aniceto Hodgkins pt does have food in her home,  Aniceto Hodgkins will also see if pt can get 10  meals through the [employer].    This CTN has not been able to speak directly with this  pt due to pts phone not working.

## 2019-09-20 NOTE — PROGRESS NOTES
Hospital Discharge Follow-Up      Date/Time:  2019 10:50 AM    Patient was admitted to [facility] on 19 and discharged on 19 for CHF acute on chronic . The physician discharge summary was available at the time of outreach. Patients daughter was  contacted within 2 business days of discharge. Top Challenges reviewed with the provider   Have not been able to reach pt due to phone lines not working         Method of communication with provider :none    Inpatient RRAT score: N/A  Was this a readmission? no   Patient stated reason for the readmission: N/A    Nurse Navigator (NN) contacted the family by telephone to perform post hospital discharge assessment. Verified name and  with family as identifiers. Provided introduction to self, and explanation of the Nurse Navigator role. Was unable to complete rest of GERALDINE as CTN is unable to reach pt and daughter is in [place] heard from mother day of discharge stating she was home from the hospital and ok, have not been able to reach since that time, due to pts phone lines not working. Reviewed discharge instructions and red flags with family who verbalized understanding. Family given an opportunity to ask questions and does not have any further questions or concerns at this time. The family agrees to contact the PCP office for questions related to their healthcare. NN provided contact information for future reference. Disease Specific:   CHF    Summary of patient's top problems:  1. CHF exacerbation- SOB, and malaise. 2. Positive FOBT with negative gross blood and baseline H&H  Requested CBC in one week. ([facility] will complete this order)      Home Health orders at discharge: [address]: [address]  Date of initial visit: TBD    Durable Medical Equipment ordered/company: none  Durable Medical Equipment received: none    Barriers to care?  depression, financial, ineffective coping, transportation    Advance Care Planning: Does patient have an Advance Directive:  not on file     Medication(s):   New Medications at Discharge: none  Changed Medications at Discharge: levothyroxine   Discontinued Medications at Discharge: none    Medication reconciliation was not preformed as pt's daughter is in [place] and does not know pts medications. Referral to Pharm D needed: no     Current Outpatient Medications   Medication Sig    methylPREDNISolone (MEDROL DOSEPACK) 4 mg tablet USE AS DIRECTED ON THE PACK    albuterol (PROVENTIL HFA, VENTOLIN HFA, PROAIR HFA) 90 mcg/actuation inhaler Take 1 Puff by inhalation every four (4) hours as needed for Wheezing or Shortness of Breath.  doxycycline (ADOXA) 100 mg tablet Take 1 Tab by mouth two (2) times a day for 10 days.  atorvastatin (LIPITOR) 40 mg tablet TAKE 1 TABLET EVERY NIGHT    cholecalciferol (VITAMIN D3) 5,000 unit capsule TAKE 1 CAPSULE EVERY DAY    methocarbamol (ROBAXIN) 500 mg tablet TAKE 1 TABLET THREE TIMES DAILY    levocetirizine (XYZAL) 5 mg tablet TAKE 1 TABLET BY MOUTH DAILY AS NEEDED FOR ALLERGIES    febuxostat (ULORIC) 40 mg tab tablet Take 1 Tab by mouth daily.  gabapentin (NEURONTIN) 300 mg capsule Take 1 Cap by mouth nightly. Max Daily Amount: 300 mg.  sertraline (ZOLOFT) 100 mg tablet TAKE 1 TABLET BY MOUTH DAILY    varicella-zoster recombinant, PF, (SHINGRIX, PF,) 50 mcg/0.5 mL susr injection 0.5mL by IntraMUSCular route once now and then repeat in 2-6 months    colchicine (MITIGARE) 0.6 mg capsule TAKE 2 CAPSULES BY MOUTH ONCE TODAY THEN 1 ONE HOUR LATER TODAY THEN STOP    LORazepam (ATIVAN) 1 mg tablet Take 1 Tab by mouth daily as needed for Anxiety.  fenofibrate nanocrystallized (TRICOR) 145 mg tablet Take 1 Tab by mouth nightly.  ferrous sulfate 325 mg (65 mg iron) tablet Take 1 Tab by mouth daily (with breakfast).     BD SINGLE USE SWABS REGULAR padm     ACCU-CHEK CHANO CONTROL SOLN soln     ACCU-CHEK CHANO PLUS TEST STRP strip     ACCU-CHEK CHANO PLUS METER misc     insulin lispro (HUMALOG U-100 INSULIN) 100 unit/mL injection Indications: sliding scale    ACCU-CHEK SOFTCLIX LANCETS misc     nebivolol (BYSTOLIC) 20 mg tablet Take 20 mg by mouth daily.  cranberry extract 450 mg tab Take  by mouth daily.  glimepiride (AMARYL) 4 mg tablet Take 4 mg by mouth two (2) times a day.  insulin detemir (LEVEMIR FLEXTOUCH) 100 unit/mL (3 mL) inpn 16 Units by SubCUTAneous route nightly.  levothyroxine (SYNTHROID) 50 mcg tablet Take 50 mcg by mouth Daily (before breakfast).  magnesium oxide (MAG-OX) 400 mg tablet Take 300 mg by mouth daily.  insulin aspart (NOVOLOG) 100 unit/mL injection 10 Units by SubCUTAneous route daily (with breakfast).  insulin aspart (NOVOLOG) 100 unit/mL injection 15 Units by SubCUTAneous route daily (with dinner).  timolol (TIMOPTIC) 0.5 % ophthalmic solution Administer 1 Drop to both eyes two (2) times a day.  anagrelide (AGRYLIN) 0.5 mg capsule Take 0.5 mg by mouth daily. Pt takes 1 tab (0.5) on tuesdays, thursdays , [address] and sundays.  acetaminophen (TYLENOL) 500 mg tablet Take 500 mg by mouth every six (6) hours as needed for Pain. No current facility-administered medications for this visit. There are no discontinued medications. [facility] follow up appointment(s):   Future Appointments   Date Time Provider Vy Cantu   9/30/2019 10:15 AM MD SUHA Loyola SCHED   11/14/2019  9:15 AM Chidi West MD WAGNER RUSTY SCHED      Non-[facility] follow up appointment(s): to be scheduled with Pulmonary, cardiology and PCP   [facility]:  information provided as a resource     No goals made as CTN has not spoken with pt.

## 2019-09-20 NOTE — PROGRESS NOTES
CT[facility] with [facility] for f/u on 10/2/19 at 1000 and f/u with PCP on 9/30/19 at 1015 am, gave Zoila Sethi pts daughter this information and she will set up transportation for pt. Michelle Ambrosio the address for both office as well as telephone numbers if the pt needs to make changes to appointments.

## 2019-09-25 ENCOUNTER — PATIENT OUTREACH (OUTPATIENT)
Dept: FAMILY MEDICINE CLINIC | Age: 79
End: 2019-09-25

## 2019-09-25 NOTE — PROGRESS NOTES
CTN has still been unsuccessful in reaching pt, spoke with [employer] [facility] this afternoon. Pt is doing well, her mother told her that her breathing has been better, [facility]. 2 nurses and PT had a home visit with pt today. Will continue to monitor.

## 2019-09-30 ENCOUNTER — OFFICE VISIT (OUTPATIENT)
Dept: FAMILY MEDICINE CLINIC | Age: 79
End: 2019-09-30

## 2019-09-30 VITALS
DIASTOLIC BLOOD PRESSURE: 63 MMHG | RESPIRATION RATE: 16 BRPM | HEIGHT: 66 IN | SYSTOLIC BLOOD PRESSURE: 124 MMHG | HEART RATE: 71 BPM | OXYGEN SATURATION: 95 % | TEMPERATURE: 98.1 F | BODY MASS INDEX: 29.57 KG/M2 | WEIGHT: 184 LBS

## 2019-09-30 DIAGNOSIS — E03.9 ACQUIRED HYPOTHYROIDISM: ICD-10-CM

## 2019-09-30 DIAGNOSIS — R19.5 HEME POSITIVE STOOL: ICD-10-CM

## 2019-09-30 DIAGNOSIS — E11.21 TYPE 2 DIABETES WITH NEPHROPATHY (HCC): ICD-10-CM

## 2019-09-30 DIAGNOSIS — I50.9 CONGESTIVE HEART FAILURE, UNSPECIFIED HF CHRONICITY, UNSPECIFIED HEART FAILURE TYPE (HCC): ICD-10-CM

## 2019-09-30 DIAGNOSIS — Z09 HOSPITAL DISCHARGE FOLLOW-UP: Primary | ICD-10-CM

## 2019-09-30 RX ORDER — LEVOTHYROXINE SODIUM 25 UG/1
TABLET ORAL
COMMUNITY

## 2019-09-30 NOTE — PROGRESS NOTES
Tod Ruelas is a 69 y.o. female who presents today with the following:    HPI  Chief Complaint   Patient presents with   [facility] Follow Up     Congestive Heart Failure. Went to [facility].    Was d/c [facility]. Seeing Dr. Lisa Pantoja on 10/09/19 for a follow up & also to see the NP for Pulmonary at Dr. Osiris Chance office       1. Hospital discharge follow-up  Patient was admitted on 9/11/2019 and discharged on 9/16/2019 at [facility].  Admission diagnosis was CHF, myeloproliferative disease, pulmonary hypertension, diabetes type 2. Patient was seen by cardiology and pulmonary during the hospital stay, stress test was done which was normal, patient was instructed to follow-up with Dr. Adalberto Escobar as outpatient. 2. Congestive heart failure, unspecified HF chronicity, unspecified heart failure type ([address]  Stable at this time, no shortness of breath, keeping her weight and blood pressure checks and log daily. I reviewed her weight and blood pressure log today    3. Type 2 diabetes with nephropathy (HCC)  Review of blood sugar log shows fasting blood sugars of 160s daily. Patient is being followed by Dr. Kris Garcia and will adjust insulin per his recommendations. 4. Acquired hypothyroidism  Patient was previously on 50 MCG Synthroid, in the hospital her Synthroid dose was decreased to 25 MCG. In the discharge summary I do not see any TSH levels. Patient declines to get lab work done today to check TSH. She wants to follow-up with Dr. Kris Garcia for her thyroid. 5. Heme positive stool  Patient had heme positive stool, hemoglobin was stable. She was recommended to make outpatient appointment with GI. Review of Systems   Constitutional: Negative. HENT: Negative. Eyes: Negative. Respiratory: Negative. Cardiovascular: Negative. Gastrointestinal: Negative. Genitourinary: Negative. Musculoskeletal: Negative. Skin: Negative. Neurological: Negative.     Endo/Heme/Allergies: Negative. Psychiatric/Behavioral: Negative. Physical Exam   Constitutional: She is oriented to person, place, and time and well-developed, well-nourished, and in no distress. HENT:   Head: Normocephalic and atraumatic. Right Ear: External ear normal.   Left Ear: External ear normal.   Nose: Nose normal.   Mouth/Throat: No oropharyngeal exudate. Eyes: Conjunctivae are normal.   Neck: Normal range of motion. Neck supple. No thyromegaly present. Cardiovascular: Normal rate and regular rhythm. Pulmonary/Chest: Effort normal and breath sounds normal.   Abdominal: Soft. Bowel sounds are normal. She exhibits no distension. There is no tenderness. Musculoskeletal: Normal range of motion. She exhibits no edema. Lymphadenopathy:     She has no cervical adenopathy. Neurological: She is alert and oriented to person, place, and time. Skin: Skin is warm and dry. Psychiatric: Mood and affect normal.   Nursing note and vitals reviewed. /63   Pulse 71   Temp 98.1 °F (36.7 °C) (Oral)   Resp 16   Ht 5' 6\" (1.676 m)   Wt 184 lb (83.5 kg)   SpO2 95%   BMI 29.70 kg/m²     Allergies   Allergen Reactions    Ace Inhibitors Other (comments)     \"Breaks out\"    Advil [Ibuprofen] Hives     Aspirin is a home med    Codeine Rash and Nausea and Vomiting     \"a little rash\"    Penicillin G Angioedema       Current Outpatient Medications   Medication Sig    levothyroxine (SYNTHROID) 25 mcg tablet Take  by mouth Daily (before breakfast).  atorvastatin (LIPITOR) 40 mg tablet TAKE 1 TABLET EVERY NIGHT    cholecalciferol (VITAMIN D3) 5,000 unit capsule TAKE 1 CAPSULE EVERY DAY    sertraline (ZOLOFT) 100 mg tablet TAKE 1 TABLET BY MOUTH DAILY    varicella-zoster recombinant, PF, (SHINGRIX, PF,) 50 mcg/0.5 mL susr injection 0.5mL by IntraMUSCular route once now and then repeat in 2-6 months    fenofibrate nanocrystallized (TRICOR) 145 mg tablet Take 1 Tab by mouth nightly.     ferrous sulfate 325 mg (65 mg iron) tablet Take 1 Tab by mouth daily (with breakfast).  BD SINGLE USE SWABS REGULAR padm     ACCU-CHEK CHANO CONTROL SOLN soln     ACCU-CHEK CHANO PLUS TEST STRP strip     ACCU-CHEK CHANO PLUS METER misc     ACCU-CHEK SOFTCLIX LANCETS [facility]     nebivolol (BYSTOLIC) 20 mg tablet Take 20 mg by mouth daily.  cranberry extract 450 mg tab Take  by mouth daily.  glimepiride (AMARYL) 4 mg tablet Take 4 mg by mouth two (2) times a day.  insulin aspart (NOVOLOG) 100 unit/mL injection 10 Units by SubCUTAneous route daily (with breakfast).  insulin aspart (NOVOLOG) 100 unit/mL injection 15 Units by SubCUTAneous route daily (with dinner).  timolol (TIMOPTIC) 0.5 % ophthalmic solution Administer 1 Drop to both eyes two (2) times a day.  anagrelide (AGRYLIN) 0.5 mg capsule Take 0.5 mg by mouth daily. Pt takes 1 tab (0.5) on tuesdays, thursdays , [address] and sundays.  acetaminophen (TYLENOL) 500 mg tablet Take 500 mg by mouth every six (6) hours as needed for Pain.  methylPREDNISolone (MEDROL DOSEPACK) 4 mg tablet USE AS DIRECTED ON THE PACK    methocarbamol (ROBAXIN) 500 mg tablet TAKE 1 TABLET THREE TIMES DAILY    febuxostat (ULORIC) 40 mg tab tablet Take 1 Tab by mouth daily.  gabapentin (NEURONTIN) 300 mg capsule Take 1 Cap by mouth nightly. Max Daily Amount: 300 mg.  colchicine (MITIGARE) 0.6 mg capsule TAKE 2 CAPSULES BY MOUTH ONCE TODAY THEN 1 ONE HOUR LATER TODAY THEN STOP    LORazepam (ATIVAN) 1 mg tablet Take 1 Tab by mouth daily as needed for Anxiety.  levothyroxine (SYNTHROID) 50 mcg tablet Take 50 mcg by mouth Daily (before breakfast).  magnesium oxide (MAG-OX) 400 mg tablet Take 300 mg by mouth daily. No current facility-administered medications for this visit.         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    Diabetes ([facility]     Dyslipidemia     Hypertension     Hypothyroidism     Ill-defined condition     ACE INHIBITOR ALLERGY WITH COUGH    Ill-defined condition     HYPERTENSIVE CARDIOMYOPATHY WITH MILD DIASTOLIC DYSFUNCTION    Ill-defined condition     glaucoma    PUD (peptic ulcer disease)        Past Surgical History:   Procedure Laterality Date    CARDIAC SURG PROCEDURE UNLIST  1/8/2015    1 stent placed    HX BREAST BIOPSY Right 2009    negative    HX BUNIONECTOMY Left     HX CATARACT REMOVAL Bilateral     HX HAMMER TOE REPAIR Left     HX HEENT      goiter removed    HX LAP CHOLECYSTECTOMY      HX ORTHOPAEDIC         Problem List  Date Reviewed: 9/30/2019          Codes Class Noted    Hypothyroidism ICD-10-CM: E03.9  ICD-9-CM: 244.9  9/30/2019        Type 2 diabetes with nephropathy ([facility] ICD-10-CM: E11.21  ICD-9-CM: 250.40, 583.81  3/28/2019        HTN (hypertension) ICD-10-CM: I10  ICD-9-CM: 401.9  10/4/2018        Diabetes ([facility] ICD-10-CM: E11.9  ICD-9-CM: 250.00  10/4/2018        Hyperlipidemia ICD-10-CM: E78.5  ICD-9-CM: 272.4  10/4/2018        Depression ICD-10-CM: F32.9  ICD-9-CM: 311  10/4/2018        Spinal stenosis ICD-10-CM: M48.00  ICD-9-CM: 724.00  5/23/2016               No results found for this visit on 09/30/19. 1. Hospital discharge follow-up  Stable and being followed by home health    2. Congestive heart failure, unspecified HF chronicity, unspecified heart failure type [facility])  Have an appointment with Dr. Diana Darnell on 10/9/2019    3. Type 2 diabetes with nephropathy [facility])  Being followed by endocrinologist Dr. Nguyen Burroughs. Advised patient to discuss decrease dose of Synthroid from the hospital and elevated fasting blood sugars with Dr. Nguyen Burroughs at the next appointment. 4. Acquired hypothyroidism  Patient's Synthroid dose was decreased from 50 MCG TO 25 MCG in the hospital    5.  Heme positive stool  Patient was previously given GI referral.  She agrees to call and make appointment with gastroenterology. I will follow-up on CBC which was drawn by [employer]. Follow-up and Dispositions    · Return in about 1 month (around 10/30/2019) for follow up. I have discussed the diagnosis with the patient and the intended plan as seen in the above orders. The patient has received an after-visit summary and questions were answered concerning future plans. I have discussed medication side effects and warnings with the patient as well. The patient agrees and understands above plan.            Rory South MD

## 2019-09-30 NOTE — PROGRESS NOTES
Patient stated name &     Chief Complaint   Patient presents with   [facility] Follow Up     Congestive Heart Failure. Went to [facility].    Was d/c 16[facility]. Seeing Dr. Baron Camacho on 10/09/19 for a follow up & also to see the NP for Pulmonary at Dr. Angelica Hobson office        Health Maintenance Due   Topic    EYE EXAM RETINAL OR DILATED     Shingrix Vaccine Age 50> (1 of 2)    Influenza Age 5 to Adult        Wt Readings from Last 3 Encounters:   19 184 lb (83.5 kg)   19 183 lb (83 kg)   19 192 lb (87.1 kg)     Temp Readings from Last 3 Encounters:   19 98.1 °F (36.7 °C) (Oral)   19 95 °F (35 °C) (Oral)   19 97.2 °F (36.2 °C) (Oral)     BP Readings from Last 3 Encounters:   19 124/63   19 97/57   19 110/65     Pulse Readings from Last 3 Encounters:   19 71   19 66   19 86         Learning Assessment:  :     Learning Assessment 10/25/2018   PRIMARY LEARNER Patient   HIGHEST LEVEL OF EDUCATION - PRIMARY LEARNER  DID NOT GRADUATE HIGH SCHOOL   PRIMARY LANGUAGE ENGLISH   LEARNER PREFERENCE PRIMARY READING   ANSWERED BY self   RELATIONSHIP SELF       Depression Screening:  :     3 most recent PHQ Screens 2019   Little interest or pleasure in doing things Not at all   Feeling down, depressed, irritable, or hopeless Not at all   Total Score PHQ 2 0       Fall Risk Assessment:  :     Fall Risk Assessment, last 12 mths 2019   Able to walk? Yes   Fall in past 12 months? No   Fall with injury? -   Number of falls in past 12 months -   Fall Risk Score -       Abuse Screening:  :     Abuse Screening Questionnaire 2019   Do you ever feel afraid of your partner? N   Are you in a relationship with someone who physically or mentally threatens you? N   Is it safe for you to go home? Y       Coordination of Care Questionnaire:  :     1) Have you been to an emergency room, urgent care clinic since your last visit?  No    Hospitalized since your last visit? No             2) Have you seen or consulted any other health care providers outside of [address] [place] [address] since your last visit? No  (Include any pap smears or colon screenings in this section.)    Patient is accompanied by self I have received verbal consent from Pradeep Llanos to discuss any/all medical information while they are present in the room. Chief Complaint   Patient presents with   [facility] Follow Up     Congestive Heart Failure. Went to [facility].    Was d/c 16[facility]. Seeing Dr. Giulia Tee on 10/09/19 for a follow up & also to see the NP for Pulmonary at Dr. Marcelino Juarez office        Health Maintenance Due   Topic    EYE EXAM RETINAL OR DILATED     Shingrix Vaccine Age 50> (1 of 2)    Influenza Age 5 to Adult        Wt Readings from Last 3 Encounters:   09/30/19 184 lb (83.5 kg)   09/11/19 183 lb (83 kg)   09/04/19 192 lb (87.1 kg)     Temp Readings from Last 3 Encounters:   09/30/19 98.1 °F (36.7 °C) (Oral)   09/11/19 95 °F (35 °C) (Oral)   09/04/19 97.2 °F (36.2 °C) (Oral)     BP Readings from Last 3 Encounters:   09/30/19 124/63   09/11/19 97/57   09/04/19 110/65     Pulse Readings from Last 3 Encounters:   09/30/19 71   09/11/19 66   09/04/19 86         Learning Assessment:  :     Learning Assessment 10/25/2018   PRIMARY LEARNER Patient   HIGHEST LEVEL OF EDUCATION - PRIMARY LEARNER  DID NOT GRADUATE HIGH SCHOOL   PRIMARY LANGUAGE ENGLISH   LEARNER PREFERENCE PRIMARY READING   ANSWERED BY self   RELATIONSHIP SELF       Depression Screening:  :     3 most recent PHQ Screens 4/24/2019   Little interest or pleasure in doing things Not at all   Feeling down, depressed, irritable, or hopeless Not at all   Total Score PHQ 2 0       Fall Risk Assessment:  :     Fall Risk Assessment, last 12 mths 9/30/2019   Able to walk? Yes   Fall in past 12 months?  No   Fall with injury? -   Number of falls in past 12 months -   Fall Risk Score -       Abuse Screening:  :     Abuse Screening Questionnaire 4/24/2019   Do you ever feel afraid of your partner? N   Are you in a relationship with someone who physically or mentally threatens you? N   Is it safe for you to go home? Y       Coordination of Care Questionnaire:  :     1) Have you been to an emergency room, urgent care clinic since your last visit? no   Hospitalized since your last visit? yes             2) Have you seen or consulted any other health care providers outside of [address] [place] [address] since your last visit? yes  (Include any pap smears or colon screenings in this section.)    3) Do you have an Advance Directive on file? no  Are you interested in receiving information about Advance Directives? no    Patient is accompanied by self I have received verbal consent from Surekha Barillas to discuss any/all medical information while they are present in the room. Reviewed record in preparation for visit and have obtained necessary documentation. Medication reconciliation up to date and corrected with patient at this time.

## 2019-10-07 ENCOUNTER — TELEPHONE (OUTPATIENT)
Dept: FAMILY MEDICINE CLINIC | Age: 79
End: 2019-10-07

## 2019-10-07 NOTE — TELEPHONE ENCOUNTER
----- Message from Marc Villarreal sent at 10/7/2019  1:53 PM EDT -----  Regarding: Dr. Андрей Kay first and last name: Niyah/  at [facility] at home       Reason for call:Requesting a verbal order for a follow up social work visit       Callback required yes/no and why: yes       Best contact number(s): [phone]      Details to clarify the request:      Marc Villarreal

## 2019-10-23 ENCOUNTER — PATIENT OUTREACH (OUTPATIENT)
Dept: FAMILY MEDICINE CLINIC | Age: 79
End: 2019-10-23

## 2019-10-23 NOTE — PROGRESS NOTES
This CTN attempted has been unable to reach pt,  Per daughter Chalo Ask pt is doing very well, she has attended all of her f/u appointments. This CTN has left a message for [facility] to return call. Pt just contacted this CTN,  States I am doing well and feeling good. Patient has graduated from the Transitions of Care Coordination  program on 10/23/19. Patient's symptoms are stable at this time. Patient/family has the ability to self-manage. Care management goals have been completed at this time. No further nurse navigator follow up scheduled. Pt has nurse navigator's contact information for any further questions, concerns, or needs.   Patients upcoming visits:    Future Appointments   Date Time Provider Vy Cantu   11/7/2019  9:15 AM Asha Parks MD [address] [place] [address]

## 2019-11-07 ENCOUNTER — OFFICE VISIT (OUTPATIENT)
Dept: FAMILY MEDICINE CLINIC | Age: 79
End: 2019-11-07

## 2019-11-07 VITALS
OXYGEN SATURATION: 97 % | SYSTOLIC BLOOD PRESSURE: 131 MMHG | BODY MASS INDEX: 30.86 KG/M2 | WEIGHT: 192 LBS | HEART RATE: 71 BPM | DIASTOLIC BLOOD PRESSURE: 70 MMHG | HEIGHT: 66 IN | TEMPERATURE: 97.7 F | RESPIRATION RATE: 18 BRPM

## 2019-11-07 DIAGNOSIS — F41.9 ANXIETY: Primary | ICD-10-CM

## 2019-11-07 RX ORDER — LORAZEPAM 1 MG/1
1 TABLET ORAL
Qty: 30 TAB | Refills: 2 | Status: SHIPPED | OUTPATIENT
Start: 2019-11-07

## 2019-11-07 NOTE — PROGRESS NOTES
1. Have you been to the ER, urgent care clinic since your last visit? Hospitalized since your last visit? No    2. Have you seen or consulted any other health care providers outside of the [address] [place] [address] since your last visit? Include any pap smears or colon screening.  No

## 2019-11-07 NOTE — PROGRESS NOTES
Byron Lau is a 67 y.o. female who presents today with the following:    HPI  Chief Complaint   Patient presents with    Medication Evaluation       1. Anxiety  Patient is here to get refill of her lorazepam.  She has been on this medication for many years. Patient reports she only takes it as needed for anxiety at night. Anxiety symptoms include worrying about her health. She denies any side effects from this medication. She denies any suicidal or homicidal ideation at this time. Review of Systems   Constitutional: Negative. HENT: Negative. Eyes: Negative. Respiratory: Negative. Cardiovascular: Negative. Gastrointestinal: Negative. Genitourinary: Negative. Musculoskeletal: Negative. Skin: Negative. Neurological: Negative. Endo/Heme/Allergies: Negative. Psychiatric/Behavioral: The patient is nervous/anxious. Physical Exam   Constitutional: She is oriented to person, place, and time and well-developed, well-nourished, and in no distress. HENT:   Head: Normocephalic and atraumatic. Right Ear: External ear normal.   Left Ear: External ear normal.   Nose: Nose normal.   Mouth/Throat: No oropharyngeal exudate. Eyes: Conjunctivae are normal.   Neck: Normal range of motion. Neck supple. No thyromegaly present. Cardiovascular: Normal rate and regular rhythm. Pulmonary/Chest: Effort normal and breath sounds normal.   Abdominal: Soft. Bowel sounds are normal. She exhibits no distension. There is no tenderness. Musculoskeletal: Normal range of motion. She exhibits no edema. Lymphadenopathy:     She has no cervical adenopathy. Neurological: She is alert and oriented to person, place, and time. Skin: Skin is warm and dry. Psychiatric: Mood and affect normal.   Nursing note and vitals reviewed.     /70 (BP 1 Location: Left arm, BP Patient Position: Sitting)   Pulse 71   Temp 97.7 °F (36.5 °C) (Oral)   Resp 18   Ht 5' 6\" (1.676 m)   Wt 192 lb (87.1 kg)   SpO2 97%   BMI 30.99 kg/m²     Allergies   Allergen Reactions    Ace Inhibitors Other (comments)     \"Breaks out\"    Advil [Ibuprofen] Hives     Aspirin is a home med    Codeine Rash and Nausea and Vomiting     \"a little rash\"    Penicillin G Angioedema       Current Outpatient Medications   Medication Sig    LORazepam (ATIVAN) 1 mg tablet Take 1 Tab by mouth nightly as needed for Anxiety. Max Daily Amount: 1 mg.  atorvastatin (LIPITOR) 40 mg tablet TAKE 1 TABLET EVERY NIGHT    cholecalciferol (VITAMIN D3) 5,000 unit capsule TAKE 1 CAPSULE EVERY DAY    methocarbamol (ROBAXIN) 500 mg tablet TAKE 1 TABLET THREE TIMES DAILY    febuxostat (ULORIC) 40 mg tab tablet Take 1 Tab by mouth daily.  sertraline (ZOLOFT) 100 mg tablet TAKE 1 TABLET BY MOUTH DAILY    fenofibrate nanocrystallized (TRICOR) 145 mg tablet Take 1 Tab by mouth nightly.  ferrous sulfate 325 mg (65 mg iron) tablet Take 1 Tab by mouth daily (with breakfast).  ACCU-CHEK CHANO CONTROL SOLN soln     ACCU-CHEK CHANO PLUS METER misc     levothyroxine (SYNTHROID) 25 mcg tablet Take  by mouth Daily (before breakfast).  gabapentin (NEURONTIN) 300 mg capsule Take 1 Cap by mouth nightly. Max Daily Amount: 300 mg.  colchicine (MITIGARE) 0.6 mg capsule TAKE 2 CAPSULES BY MOUTH ONCE TODAY THEN 1 ONE HOUR LATER TODAY THEN STOP    BD SINGLE USE SWABS REGULAR padm     ACCU-CHEK HCANO PLUS TEST STRP strip     ACCU-CHEK SOFTCLIX LANCETS [facility]     nebivolol (BYSTOLIC) 20 mg tablet Take 20 mg by mouth daily.  cranberry extract 450 mg tab Take  by mouth daily.  glimepiride (AMARYL) 4 mg tablet Take 4 mg by mouth two (2) times a day.  levothyroxine (SYNTHROID) 50 mcg tablet Take 50 mcg by mouth Daily (before breakfast).  magnesium oxide (MAG-OX) 400 mg tablet Take 300 mg by mouth daily.  insulin aspart (NOVOLOG) 100 unit/mL injection 10 Units by SubCUTAneous route daily (with breakfast).     insulin aspart (NOVOLOG) 100 unit/mL injection 15 Units by SubCUTAneous route daily (with dinner).  timolol (TIMOPTIC) 0.5 % ophthalmic solution Administer 1 Drop to both eyes two (2) times a day.  anagrelide (AGRYLIN) 0.5 mg capsule Take 0.5 mg by mouth daily. Pt takes 1 tab (0.5) on tuesdays, thursdays , [address] and sundays.  acetaminophen (TYLENOL) 500 mg tablet Take 500 mg by mouth every six (6) hours as needed for Pain. No current facility-administered medications for this visit.         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    Diabetes ([facility]     Dyslipidemia     Hypertension     Hypothyroidism     Ill-defined condition     ACE INHIBITOR ALLERGY WITH COUGH    Ill-defined condition     HYPERTENSIVE CARDIOMYOPATHY WITH MILD DIASTOLIC DYSFUNCTION    Ill-defined condition     glaucoma    PUD (peptic ulcer disease)        Past Surgical History:   Procedure Laterality Date    CARDIAC SURG PROCEDURE UNLIST  1/8/2015    1 stent placed    HX BREAST BIOPSY Right 2009    negative    HX BUNIONECTOMY Left     HX CATARACT REMOVAL Bilateral     HX HAMMER TOE REPAIR Left     HX HEENT      goiter removed    HX LAP CHOLECYSTECTOMY      HX ORTHOPAEDIC         Problem List  Date Reviewed: 11/7/2019          Codes Class Noted    Hypothyroidism ICD-10-CM: E03.9  ICD-9-CM: 244.9  9/30/2019        Type 2 diabetes with nephropathy ([facility] ICD-10-CM: E11.21  ICD-9-CM: 250.40, 583.81  3/28/2019        HTN (hypertension) ICD-10-CM: I10  ICD-9-CM: 401.9  10/4/2018        Diabetes ([facility] ICD-10-CM: E11.9  ICD-9-CM: 250.00  10/4/2018        Hyperlipidemia ICD-10-CM: E78.5  ICD-9-CM: 272.4  10/4/2018        Depression ICD-10-CM: F32.9  ICD-9-CM: 311  10/4/2018        Spinal stenosis ICD-10-CM: M48.00  ICD-9-CM: 724.00  5/23/2016               No results found for this visit on 11/07/19.      1. Anxiety  3 months supply given  - LORazepam (ATIVAN) 1 mg tablet; Take 1 Tab by mouth nightly as needed for Anxiety. Max Daily Amount: 1 mg. Dispense: 30 Tab; Refill: 2        Follow-up and Dispositions    · Return in about 3 months (around 2/7/2020) for follow up, med check. I have discussed the diagnosis with the patient and the intended plan as seen in the above orders. The patient has received an after-visit summary and questions were answered concerning future plans. I have discussed medication side effects and warnings with the patient as well. The patient agrees and understands above plan.            Rob Nguyen MD

## 2020-01-13 DIAGNOSIS — E55.9 VITAMIN D DEFICIENCY: ICD-10-CM

## 2020-01-13 RX ORDER — CHOLECALCIFEROL (VITAMIN D3) 125 MCG
CAPSULE ORAL
Qty: 90 CAP | Refills: 1 | Status: SHIPPED | OUTPATIENT
Start: 2020-01-13

## 2020-01-13 RX ORDER — ATORVASTATIN CALCIUM 40 MG/1
TABLET, FILM COATED ORAL
Qty: 90 TAB | Refills: 1 | Status: SHIPPED | OUTPATIENT
Start: 2020-01-13 | End: 2020-07-14

## 2020-01-16 DIAGNOSIS — F32.A DEPRESSION, UNSPECIFIED DEPRESSION TYPE: ICD-10-CM

## 2020-01-16 DIAGNOSIS — M10.9 ACUTE GOUT OF RIGHT FOOT, UNSPECIFIED CAUSE: ICD-10-CM

## 2020-01-20 RX ORDER — SERTRALINE HYDROCHLORIDE 100 MG/1
TABLET, FILM COATED ORAL
Qty: 90 TAB | Refills: 1 | Status: SHIPPED | OUTPATIENT
Start: 2020-01-20 | End: 2020-06-16 | Stop reason: SDUPTHER

## 2020-01-20 RX ORDER — FEBUXOSTAT 40 MG/1
TABLET, FILM COATED ORAL
Qty: 90 TAB | Refills: 1 | Status: SHIPPED | OUTPATIENT
Start: 2020-01-20 | End: 2020-01-28 | Stop reason: CLARIF

## 2020-01-27 ENCOUNTER — TELEPHONE (OUTPATIENT)
Dept: FAMILY MEDICINE CLINIC | Age: 80
End: 2020-01-27

## 2020-01-27 NOTE — TELEPHONE ENCOUNTER
Received fax from pharmacy stating formulary alt for febuxostat 40mg is allopurinol 100mg or 300mg if you would like to send in alt to [facility] mail order pharmacy

## 2020-01-28 DIAGNOSIS — M10.9 ACUTE GOUT OF RIGHT FOOT, UNSPECIFIED CAUSE: Primary | ICD-10-CM

## 2020-01-28 RX ORDER — ALLOPURINOL 100 MG/1
100 TABLET ORAL DAILY
Qty: 90 TAB | Refills: 1 | Status: SHIPPED | OUTPATIENT
Start: 2020-01-28

## 2020-01-28 NOTE — TELEPHONE ENCOUNTER
Please inform patient febuxostat was changed to allopurinol 100 mg po daily due to insurance formulary preference. New prescription sent to [facility]  thanks

## 2020-02-28 ENCOUNTER — OFFICE VISIT (OUTPATIENT)
Dept: FAMILY MEDICINE CLINIC | Age: 80
End: 2020-02-28

## 2020-02-28 VITALS
TEMPERATURE: 97.7 F | WEIGHT: 198 LBS | HEART RATE: 82 BPM | RESPIRATION RATE: 16 BRPM | HEIGHT: 66 IN | BODY MASS INDEX: 31.82 KG/M2 | OXYGEN SATURATION: 97 % | DIASTOLIC BLOOD PRESSURE: 66 MMHG | SYSTOLIC BLOOD PRESSURE: 103 MMHG

## 2020-02-28 DIAGNOSIS — R07.9 CHEST PAIN, UNSPECIFIED TYPE: ICD-10-CM

## 2020-02-28 DIAGNOSIS — R06.02 SOB (SHORTNESS OF BREATH): Primary | ICD-10-CM

## 2020-02-28 DIAGNOSIS — I50.9 CONGESTIVE HEART FAILURE, UNSPECIFIED HF CHRONICITY, UNSPECIFIED HEART FAILURE TYPE (HCC): ICD-10-CM

## 2020-02-28 RX ORDER — FAMOTIDINE 20 MG/1
TABLET, FILM COATED ORAL
COMMUNITY
Start: 2020-02-18

## 2020-02-28 RX ORDER — LANOLIN ALCOHOL/MO/W.PET/CERES
500 CREAM (GRAM) TOPICAL DAILY
COMMUNITY

## 2020-02-28 RX ORDER — CARVEDILOL 12.5 MG/1
TABLET ORAL
COMMUNITY
Start: 2020-01-31 | End: 2021-06-02

## 2020-02-28 NOTE — PROGRESS NOTES
Assessment/Plan:     Diagnoses and all orders for this visit:    1. SOB (shortness of breath)  -     AMB POC EKG ROUTINE W/ 12 LEADS, INTER & REP  - EKG shows occasional PVC, otherwise normal  Declines being transported to ED today. States will go home and call cardiology. Discussed going to ER if symptoms persist and she is not able to get into Cardiology. She agreed    2. Congestive heart failure, unspecified HF chronicity, unspecified heart failure type ([address]  - Presumed stable, follow up with cardiology    3. Chest pain, unspecified type  - Intermittent and unchanged. Encouraged to go to ER but patient declined. Follow-up and Dispositions    · Return for PRN. Discussed expected course/resolution/complications of diagnosis in detail with patient. Medication risks/benefits/costs/interactions/alternatives discussed with patient. Pt was given after visit summary which includes diagnoses, current medications & vitals. Pt expressed understanding with the diagnosis and plan        Subjective:      Kat Glynn is a 67 y.o. female who presents for had concerns including Breathing Problem (x 3 days  not all the time ). States here today for SOB for 3 days. States her SOB is on exertion. Also complains of chest pain intermittent for the past 3 day. Has a history of CHF. Denies cough, congestion, fever, wheezing, orthopnea, palpitations or leg swelling. Sees cardiology, has been \"awhile\"   BP is at goal today    Current Outpatient Medications   Medication Sig Dispense Refill    cyanocobalamin (VITAMIN B12) 500 mcg tablet Take 500 mcg by mouth daily.  allopurinoL (ZYLOPRIM) 100 mg tablet Take 1 Tab by mouth daily.  Indications: treatment to prevent acute gout attack 90 Tab 1    sertraline (ZOLOFT) 100 mg tablet TAKE 1 TABLET EVERY DAY 90 Tab 1    cholecalciferol (VITAMIN D3) (5000 Units /125 mcg) capsule TAKE 1 CAPSULE EVERY DAY 90 Cap 1    atorvastatin (LIPITOR) 40 mg tablet TAKE 1 TABLET EVERY NIGHT 90 Tab 1    LORazepam (ATIVAN) 1 mg tablet Take 1 Tab by mouth nightly as needed for Anxiety. Max Daily Amount: 1 mg. 30 Tab 2    levothyroxine (SYNTHROID) 25 mcg tablet Take  by mouth Daily (before breakfast).  colchicine (MITIGARE) 0.6 mg capsule TAKE 2 CAPSULES BY MOUTH ONCE TODAY THEN 1 ONE HOUR LATER TODAY THEN STOP 6 Cap 0    ferrous sulfate 325 mg (65 mg iron) tablet Take 1 Tab by mouth daily (with breakfast). 30 Tab 11    BD SINGLE USE SWABS REGULAR padm       ACCU-CHEK CHANO CONTROL SOLN soln       ACCU-CHEK CHANO PLUS TEST STRP strip       ACCU-CHEK CHANO PLUS METER misc       ACCU-CHEK SOFTCLIX LANCETS misc       cranberry extract 450 mg tab Take  by mouth daily.  glimepiride (AMARYL) 4 mg tablet Take 4 mg by mouth two (2) times a day.  insulin aspart (NOVOLOG) 100 unit/mL injection 10 Units by SubCUTAneous route daily (with breakfast).  insulin aspart (NOVOLOG) 100 unit/mL injection 15 Units by SubCUTAneous route daily (with dinner).  timolol (TIMOPTIC) 0.5 % ophthalmic solution Administer 1 Drop to both eyes two (2) times a day.  anagrelide (AGRYLIN) 0.5 mg capsule Take 0.5 mg by mouth daily. Pt takes 1 tab (0.5) on tuesdays, thursdays , [address] and sundays.  acetaminophen (TYLENOL) 500 mg tablet Take 500 mg by mouth every six (6) hours as needed for Pain.      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    Diabetes ([facility]     Dyslipidemia     Hypertension     Hypothyroidism     Ill-defined condition     ACE INHIBITOR ALLERGY WITH COUGH    Ill-defined condition     HYPERTENSIVE CARDIOMYOPATHY WITH MILD DIASTOLIC DYSFUNCTION    Ill-defined condition     glaucoma    PUD (peptic ulcer disease)      Past Surgical History:   Procedure Laterality Date    CARDIAC SURG PROCEDURE UNLIST  1/8/2015    1 stent placed    HX BREAST BIOPSY Right 2009    negative    HX BUNIONECTOMY Left     HX CATARACT REMOVAL Bilateral     HX HAMMER TOE REPAIR Left     HX HEENT      goiter removed    HX LAP CHOLECYSTECTOMY      HX ORTHOPAEDIC       Family History   Problem Relation Age of Onset    Diabetes Mother     Heart Disease Father     Diabetes Brother     Heart Disease Brother     Diabetes Brother     Heart Disease Brother     Anesth Problems Neg Hx      Social History     Socioeconomic History    Marital status:      Spouse name: Not on file    Number of children: Not on file    Years of education: Not on file    Highest education level: Not on file   Occupational History    Not on file   Social Needs    Financial resource strain: Not on file    Food insecurity:     Worry: Not on file     Inability: Not on file    Transportation needs:     Medical: Not on file     Non-medical: Not on file   Tobacco Use    Smoking status: Never Smoker    Smokeless tobacco: Never Used   Substance and Sexual Activity    Alcohol use: No    Drug use: No    Sexual activity: Never   Lifestyle    Physical activity:     Days per week: Not on file     Minutes per session: Not on file    Stress: Not on file   Relationships    Social connections:     Talks on phone: Not on file     Gets together: Not on file     Attends Muslim service: Not on file     Active member of club or organization: Not on file     Attends meetings of clubs or organizations: Not on file     Relationship status: Not on file    Intimate partner violence:     Fear of current or ex partner: Not on file Emotionally abused: Not on file     Physically abused: Not on file     Forced sexual activity: Not on file   Other Topics Concern    Not on file   Social History Narrative    Not on file       HPI      ROS:   Review of Systems   Constitutional: Negative for chills, fever and malaise/fatigue. Eyes: Negative for blurred vision. Respiratory: Positive for shortness of breath. Negative for cough. Cardiovascular: Positive for chest pain. Negative for palpitations, orthopnea, claudication and leg swelling. Neurological: Negative for dizziness and headaches. Objective:     Visit Vitals  /66 (BP 1 Location: Right arm, BP Patient Position: Sitting)   Pulse 82   Temp 97.7 °F (36.5 °C) (Oral)   Resp 16   Ht 5' 6\" (1.676 m)   Wt 198 lb (89.8 kg)   SpO2 97%   BMI 31.96 kg/m²         Vitals and Nurse Documentation reviewed. Physical Exam  Constitutional:       General: She is not in acute distress. Appearance: She is not diaphoretic. HENT:      Head: Normocephalic and atraumatic. Cardiovascular:      Rate and Rhythm: Normal rate. Rhythm regularly irregular. Heart sounds: Normal heart sounds. No murmur. No friction rub. No gallop. Pulmonary:      Effort: Pulmonary effort is normal. No respiratory distress. Breath sounds: Normal breath sounds. No wheezing or rales. Skin:     General: Skin is warm and dry. Coloration: Skin is not pale. Neurological:      Mental Status: She is alert and oriented to person, place, and time. Psychiatric:         Mood and Affect: Affect normal. Mood is not anxious or depressed. Behavior: Behavior is not agitated. Thought Content: Thought content does not include homicidal or suicidal ideation.          Results for orders placed or performed in visit on 08/15/19   HEMOGLOBIN A1C WITH EAG   Result Value Ref Range    Hemoglobin A1c 8.0 (H) 4.8 - 5.6 %    Estimated average glucose 183 mg/dL   MICROALBUMIN, UR, RAND W/ MICROALB/CREAT RATIO   Result Value Ref Range    Creatinine, urine 121.5 Not Estab. mg/dL    Microalbumin, urine 7.1 Not Estab. ug/mL    Microalb/Creat ratio (ug/mg creat.) 5.8 0.0 - 30.0 mg/g creat

## 2020-02-28 NOTE — PATIENT INSTRUCTIONS
Shortness of Breath: Care Instructions Your Care Instructions Shortness of breath has many causes. Sometimes conditions such as anxiety can lead to shortness of breath. Some people get mild shortness of breath when they exercise. Trouble breathing also can be a symptom of a serious problem, such as asthma, lung disease, emphysema, heart problems, and pneumonia. If your shortness of breath continues, you may need tests and treatment. Watch for any changes in your breathing and other symptoms. Follow-up care is a key part of your treatment and safety. Be sure to make and go to all appointments, and call your doctor if you are having problems. It's also a good idea to know your test results and keep a list of the medicines you take. How can you care for yourself at home? · Do not smoke or allow others to smoke around you. If you need help quitting, talk to your doctor about stop-smoking programs and medicines. These can increase your chances of quitting for good. · Get plenty of rest and sleep. · Take your medicines exactly as prescribed. Call your doctor if you think you are having a problem with your medicine. · Find healthy ways to deal with stress. ? Exercise daily. ? Get plenty of sleep. ? Eat regularly and well. When should you call for help? Call 911 anytime you think you may need emergency care. For example, call if: 
  · You have severe shortness of breath.  
  · You have symptoms of a heart attack. These may include: 
? Chest pain or pressure, or a strange feeling in the chest. 
? Sweating. ? Shortness of breath. ? Nausea or vomiting. ? Pain, pressure, or a strange feeling in the back, neck, jaw, or upper belly or in one or both shoulders or arms. ? Lightheadedness or sudden weakness. ? A fast or irregular heartbeat. After you call 911, the  may tell you to chew 1 adult-strength or 2 to 4 low-dose aspirin. Wait for an ambulance. Do not try to drive yourself.  Call your doctor now or seek immediate medical care if: 
  · Your shortness of breath gets worse or you start to wheeze. Wheezing is a high-pitched sound when you breathe.  
  · You wake up at night out of breath or have to prop your head up on several pillows to breathe.  
  · You are short of breath after only light activity or while at rest.  
 Watch closely for changes in your health, and be sure to contact your doctor if: 
  · You do not get better over the next 1 to 2 days. Where can you learn more? Go to http://solomon-adams.info/. Enter S780 in the search box to learn more about \"Shortness of Breath: Care Instructions. \" Current as of: June 9, 2019 Content Version: 12.2 © 5997-0008 [employer]. Care instructions adapted under license by [employer] (which disclaims liability or warranty for this information). If you have questions about a medical condition or this instruction, always ask your healthcare professional. [address] any warranty or liability for your use of this information.

## 2020-02-28 NOTE — PROGRESS NOTES
Identified pt with two pt identifiers(name and ). Reviewed record in preparation for visit and have obtained necessary documentation. Chief Complaint   Patient presents with    Breathing Problem     x 3 days  not all the time         Health Maintenance Due   Topic    Eye Exam Retinal or Dilated     Shingrix Vaccine Age 50> (1 of 2)       Coordination of Care Questionnaire:  :   1) Have you been to an emergency room, urgent care, or hospitalized since your last visit? If yes, where when, and reason for visit? No       2. Have seen or consulted any other health care provider since your last visit? If yes, where when, and reason for visit?   Yes

## 2020-06-03 DIAGNOSIS — F41.9 ANXIETY: ICD-10-CM

## 2020-06-03 RX ORDER — LORAZEPAM 1 MG/1
TABLET ORAL
Qty: 30 TAB | OUTPATIENT
Start: 2020-06-03

## 2020-06-15 ENCOUNTER — TELEPHONE (OUTPATIENT)
Dept: FAMILY MEDICINE CLINIC | Age: 80
End: 2020-06-15

## 2020-06-15 NOTE — TELEPHONE ENCOUNTER
Sandra [phone], VM is confidential-   Maggie Mata is requesting to still see pt once a week for PT and  OT skilled nursing- needs verbal

## 2020-06-16 ENCOUNTER — VIRTUAL VISIT (OUTPATIENT)
Dept: FAMILY MEDICINE CLINIC | Age: 80
End: 2020-06-16

## 2020-06-16 DIAGNOSIS — Z09 HOSPITAL DISCHARGE FOLLOW-UP: Primary | ICD-10-CM

## 2020-06-16 DIAGNOSIS — F32.A DEPRESSION, UNSPECIFIED DEPRESSION TYPE: ICD-10-CM

## 2020-06-16 RX ORDER — SERTRALINE HYDROCHLORIDE 100 MG/1
150 TABLET, FILM COATED ORAL DAILY
Qty: 135 TAB | Refills: 3 | Status: SHIPPED | OUTPATIENT
Start: 2020-06-16 | End: 2021-06-16

## 2020-06-16 NOTE — PROGRESS NOTES
Quentin Mota is a 67 y.o. female evaluated via telephone on 6/16/2020. Consent:  She and/or health care decision maker is aware that that she may receive a bill for this telephone service, depending on her insurance coverage, and has provided verbal consent to proceed: Yes      Documentation:  I communicated with the patient and/or health care decision maker about Edil Jung. Details of this discussion including any medical advice provided:       Assessment & Plan:   Diagnoses and all orders for this visit:    1. Hospital discharge follow-up    2. Depression, unspecified depression type  -     sertraline (ZOLOFT) 100 mg tablet; Take 1.5 Tabs by mouth daily. I WILL GET RECORDS FROM [facility].  INCREASE ZOLOFT  MG DAILY      Follow-up and Dispositions    · Return in about 2 weeks (around 6/30/2020) for follow up. Edith Maya is a 67 y.o. female who was seen for Other ([address] )      1. Depression, unspecified depression type  Quentin Mota is a 67 y.o. female who presents for follow up of of depression. She complains of depressed mood and fatigue. her symptoms are  gradually worsening. She denies current suicidal and homicidal plan or intent. Family history significant for nothing. Previous treatment includes SSRI and no other therapies. She complains of the following side effects from the treatment: none. 2. Hospital discharge follow-up  DATE OF DISCHARGE: 06/10/2020 ADMITTED TO [facility] 40 1 WEEK. DIAGNOSIS: CHEST PAIN, ANEMIA, GI BLEED, GASTIC ULCER  EGD WAS PERFORMED. GI: DR. CAM, CARDIOLOGY: DR. LANDIS, ENDO:   HGB AT ADMISSION: 6, AT DISCHARGE : 9.2, WAS GIVEN IV IRON. PATIENT TO F/UP WITH  ON 6/26, F/UP WITH DR. CAM . [facility] PT/OT WITH ARJUN  ADDED [facility]. NO HOSPITAL RECORDS AVAILABLE AT THIS TIME.          Prior to Admission medications    Medication Sig Start Date End Date Taking? Authorizing Provider   sertraline (ZOLOFT) 100 mg tablet Take 1.5 Tabs by mouth daily. 6/16/20 6/16/21 Yes Marilee Bah MD   ticagrelor (BRILINTA PO) Take  by mouth. Yes Provider, Historical   carvediloL (COREG) 12.5 mg tablet  1/31/20  Yes Provider, Historical   famotidine (PEPCID) 20 mg tablet  2/18/20  Yes Provider, Historical   cyanocobalamin (VITAMIN B12) 500 mcg tablet Take 500 mcg by mouth daily. Yes Provider, Historical   allopurinoL (ZYLOPRIM) 100 mg tablet Take 1 Tab by mouth daily. Indications: treatment to prevent acute gout attack 1/28/20  Yes Marilee Bah MD   cholecalciferol (VITAMIN D3) (5000 Units /125 mcg) capsule TAKE 1 CAPSULE EVERY DAY 1/13/20  Yes Marilee Bah MD   atorvastatin (LIPITOR) 40 mg tablet TAKE 1 TABLET EVERY NIGHT 1/13/20  Yes Marilee Bah MD   LORazepam (ATIVAN) 1 mg tablet Take 1 Tab by mouth nightly as needed for Anxiety. Max Daily Amount: 1 mg. 11/7/19  Yes Marilee Bah MD   levothyroxine (SYNTHROID) 25 mcg tablet Take  by mouth Daily (before breakfast). Yes Provider, Historical   ferrous sulfate 325 mg (65 mg iron) tablet Take 1 Tab by mouth daily (with breakfast). 4/11/19  Yes Marilee Bah MD   BD SINGLE USE SWABS REGULAR padm  3/15/19  Yes Provider, Historical   ACCU-CHEK CHANO CONTROL SOLN soln  3/15/19  Yes Provider, Historical   ACCU-CHEK CHANO PLUS TEST STRP strip  3/15/19  Yes Provider, Historical   ACCU-CHEK CHANO PLUS METER misc  3/15/19  Yes Provider, Historical   ACCU-CHEK SOFTCLIX LANCETS misc  3/15/19  Yes Provider, Historical   cranberry extract 450 mg tab Take  by mouth daily. Yes Provider, Historical   glimepiride (AMARYL) 4 mg tablet Take 4 mg by mouth two (2) times a day. Yes Provider, Historical   insulin aspart (NOVOLOG) 100 unit/mL injection 10 Units by SubCUTAneous route daily (with breakfast). Yes Provider, Historical   insulin aspart (NOVOLOG) 100 unit/mL injection 15 Units by SubCUTAneous route daily (with dinner). Yes Provider, Historical   timolol (TIMOPTIC) 0.5 % ophthalmic solution Administer 1 Drop to both eyes two (2) times a day. Yes Provider, Historical   anagrelide (AGRYLIN) 0.5 mg capsule Take 0.5 mg by mouth daily. Pt takes 1 tab (0.5) on tuesdays, thursdays , [address] and sundays. Yes Provider, Historical   acetaminophen (TYLENOL) 500 mg tablet Take 500 mg by mouth every six (6) hours as needed for Pain. Yes Provider, Historical   sertraline (ZOLOFT) 100 mg tablet TAKE 1 TABLET EVERY DAY 1/20/20 6/16/20  Santiago Posey MD   colchicine (MITIGARE) 0.6 mg capsule TAKE 2 CAPSULES BY MOUTH ONCE TODAY THEN 1 ONE HOUR LATER TODAY THEN STOP 5/19/19   Santiago Posey MD     Allergies   Allergen Reactions    Ace Inhibitors Other (comments)     \"Breaks out\"    Advil [Ibuprofen] Hives     Aspirin is a home med    Codeine Rash and Nausea and Vomiting     \"a little rash\"    Penicillin G Angioedema       Patient Active Problem List   Diagnosis Code    Spinal stenosis M48.00    HTN (hypertension) I10    Diabetes ([facility] E11.9    Hyperlipidemia E78.5    Depression F32.9    Type 2 diabetes with nephropathy ([facility] E11.21    Hypothyroidism E03.9     Patient Active Problem List    Diagnosis Date Noted    Hypothyroidism 09/30/2019    Type 2 diabetes with nephropathy ([facility] 03/28/2019    HTN (hypertension) 10/04/2018    Diabetes ([facility] [facility] 10/04/2018    Hyperlipidemia 10/04/2018    Depression 10/04/2018    Spinal stenosis 05/23/2016     Current Outpatient Medications   Medication Sig Dispense Refill    sertraline (ZOLOFT) 100 mg tablet Take 1.5 Tabs by mouth daily. 135 Tab 3    ticagrelor (BRILINTA PO) Take  by mouth.  carvediloL (COREG) 12.5 mg tablet       famotidine (PEPCID) 20 mg tablet       cyanocobalamin (VITAMIN B12) 500 mcg tablet Take 500 mcg by mouth daily.  allopurinoL (ZYLOPRIM) 100 mg tablet Take 1 Tab by mouth daily.  Indications: treatment to prevent acute gout attack 90 Tab 1    cholecalciferol (VITAMIN D3) (5000 Units /125 mcg) capsule TAKE 1 CAPSULE EVERY DAY 90 Cap 1    atorvastatin (LIPITOR) 40 mg tablet TAKE 1 TABLET EVERY NIGHT 90 Tab 1    LORazepam (ATIVAN) 1 mg tablet Take 1 Tab by mouth nightly as needed for Anxiety. Max Daily Amount: 1 mg. 30 Tab 2    levothyroxine (SYNTHROID) 25 mcg tablet Take  by mouth Daily (before breakfast).  ferrous sulfate 325 mg (65 mg iron) tablet Take 1 Tab by mouth daily (with breakfast). 30 Tab 11    BD SINGLE USE SWABS REGULAR padm       ACCU-CHEK CHANO CONTROL SOLN soln       ACCU-CHEK CHANO PLUS TEST STRP strip       ACCU-CHEK CHANO PLUS METER misc       ACCU-CHEK SOFTCLIX LANCETS misc       cranberry extract 450 mg tab Take  by mouth daily.  glimepiride (AMARYL) 4 mg tablet Take 4 mg by mouth two (2) times a day.  insulin aspart (NOVOLOG) 100 unit/mL injection 10 Units by SubCUTAneous route daily (with breakfast).  insulin aspart (NOVOLOG) 100 unit/mL injection 15 Units by SubCUTAneous route daily (with dinner).  timolol (TIMOPTIC) 0.5 % ophthalmic solution Administer 1 Drop to both eyes two (2) times a day.  anagrelide (AGRYLIN) 0.5 mg capsule Take 0.5 mg by mouth daily. Pt takes 1 tab (0.5) on tuesdays, thursdays , [address] and sundays.  acetaminophen (TYLENOL) 500 mg tablet Take 500 mg by mouth every six (6) hours as needed for Pain.      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    Diabetes ([facility] [facility]  Dyslipidemia     Hypertension     Hypothyroidism     Ill-defined condition     ACE INHIBITOR ALLERGY WITH COUGH    Ill-defined condition     HYPERTENSIVE CARDIOMYOPATHY WITH MILD DIASTOLIC DYSFUNCTION    Ill-defined condition     glaucoma    PUD (peptic ulcer disease)      Past Surgical History:   Procedure Laterality Date    CARDIAC SURG PROCEDURE UNLIST  1/8/2015    1 stent placed    HX BREAST BIOPSY Right 2009    negative    HX BUNIONECTOMY Left     HX CATARACT REMOVAL Bilateral     HX HAMMER TOE REPAIR Left     HX HEENT      goiter removed    HX LAP CHOLECYSTECTOMY      HX ORTHOPAEDIC       Family History   Problem Relation Age of Onset    Diabetes Mother     Heart Disease Father     Diabetes Brother     Heart Disease Brother     Diabetes Brother     Heart Disease Brother     Anesth Problems Neg Hx      Social History     Tobacco Use    Smoking status: Never Smoker    Smokeless tobacco: Never Used   Substance Use Topics    Alcohol use: No       Review of Systems   Constitutional: Positive for malaise/fatigue. HENT: Negative. Eyes: Negative. Respiratory: Negative. Cardiovascular: Negative. Gastrointestinal: Negative. Genitourinary: Negative. Musculoskeletal: Negative. Skin: Negative. Neurological: Negative. Endo/Heme/Allergies: Negative. Psychiatric/Behavioral: Positive for depression. Negative for suicidal ideas. Patient was located at [facility]. I was at home while conducting this encounter. I affirm this is a Patient Initiated Episode with an Established Patient who has not had a related appointment within my department in the past 7 days or scheduled within the next 24 hours.     Total Time: minutes: 21-30 minutes    Note: not billable if this call serves to triage the patient into an appointment for the relevant concern      Erasmo West MD

## 2020-06-16 NOTE — PROGRESS NOTES
Identified pt with two pt identifiers(name and ). Reviewed record in preparation for visit and have obtained necessary documentation. Chief Complaint   Patient presents with   [id number] [address] [facility] Due   Topic    Eye Exam Retinal or Dilated     Shingrix Vaccine Age 50> (1 of 2)    Lipid Screen     Medicare Yearly Exam     Foot Exam Q1        Coordination of Care Questionnaire:  :   1) Have you been to an emergency room, urgent care, or hospitalized since your last visit? If yes, where when, and reason for visit? Yes, [facility]      2. Have seen or consulted any other health care provider since your last visit? If yes, where when, and reason for visit?  no        Patient is accompanied by self I have received verbal consent from Alvin Chavez to discuss any/all medical information while they are present in the room.

## 2020-06-16 NOTE — TELEPHONE ENCOUNTER
Received call for [facility] wanting to get a verbal order for PT-11 visits.   Please call  [phone] if approved

## 2020-07-07 ENCOUNTER — VIRTUAL VISIT (OUTPATIENT)
Dept: FAMILY MEDICINE CLINIC | Age: 80
End: 2020-07-07

## 2020-07-07 DIAGNOSIS — E78.5 HYPERLIPIDEMIA, UNSPECIFIED HYPERLIPIDEMIA TYPE: ICD-10-CM

## 2020-07-07 DIAGNOSIS — E03.9 ACQUIRED HYPOTHYROIDISM: ICD-10-CM

## 2020-07-07 DIAGNOSIS — E11.21 TYPE 2 DIABETES WITH NEPHROPATHY (HCC): ICD-10-CM

## 2020-07-07 DIAGNOSIS — E55.9 VITAMIN D DEFICIENCY: ICD-10-CM

## 2020-07-07 DIAGNOSIS — Z09 HOSPITAL DISCHARGE FOLLOW-UP: ICD-10-CM

## 2020-07-07 DIAGNOSIS — Z09 HOSPITAL DISCHARGE FOLLOW-UP: Primary | ICD-10-CM

## 2020-07-07 NOTE — PROGRESS NOTES
1. Have you been to the ER, urgent care clinic since your last visit? Hospitalized since your last visit? Yes When: 6/26/2020 Where: [facility] Reason for visit: Fall    2. Have you seen or consulted any other health care providers outside of the [address] [place] [address] since your last visit? Include any pap smears or colon screening. No     Chief Complaint   Patient presents with   [facility] Follow Up     Patient states concerns blood clot, fall injury.  Heart Problem     Patient reports stents placed on 6/4/2020.

## 2020-07-07 NOTE — PROGRESS NOTES
Rick Alex is a 67 y.o. female evaluated via telephone on 7/7/2020. Consent:  She and/or health care decision maker is aware that that she may receive a bill for this telephone service, depending on her insurance coverage, and has provided verbal consent to proceed: Yes      Documentation:  I communicated with the patient and/or health care decision maker about thyroid mass found on ct scan in hospital.   Details of this discussion including any medical advice provided:       Assessment & Plan:   Diagnoses and all orders for this visit:    1. Hospital discharge follow-up  -     THYROID CASCADE PROFILE; Future  -     HEMOGLOBIN A1C WITH EAG; Future  -     LIPID PANEL; Future  -     METABOLIC PANEL, COMPREHENSIVE; Future  -     VITAMIN D, 25 HYDROXY; Future    2. Vitamin D deficiency  -     VITAMIN D, 25 HYDROXY; Future    3. Type 2 diabetes with nephropathy (HCC)  -     HEMOGLOBIN A1C WITH EAG; Future  -     METABOLIC PANEL, COMPREHENSIVE; Future  -     MICROALBUMIN, UR, RAND W/ MICROALB/CREAT RATIO; Future    4. Acquired hypothyroidism  -     THYROID CASCADE PROFILE; Future  -     US THYROID/PARATHYROID/SOFT TISS; Future    5. Hyperlipidemia, unspecified hyperlipidemia type  -     LIPID PANEL; Future  -     METABOLIC PANEL, COMPREHENSIVE; Future          Follow-up and Dispositions    · Return in about 2 weeks (around 7/21/2020) for follow up. I ADVISED PATIENT TO GO TO ER IF SYMPTOMS WORSEN , CHANGE OR FAILS TO IMPROVE. Edith Maya is a 67 y.o. female who was seen for Hospital Follow Up (Patient states concerns blood clot, fall injury.) and Heart Problem (Patient reports stents placed on 6/4/2020.)      1. Hospital discharge follow-up  Recent hospital admission to [facility], admitted: 6/26/2020 , discharged: 7/4/2020. Admitted for fall. Insulin adjusted: 15 units BID  Thyroid nodule noted on ct scan.   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 Crane. reports getting blood transfusion in hospital. Follow up [facility]  on Friday. Recent GI workup negative. 2. Vitamin D deficiency  check labs    3. Type 2 diabetes with nephropathy (HCC)  check labs, insulin recently adjusted in hospital. Managed by     4. Acquired hypothyroidism  Reports a thyroid mass was noted on ct scan in hospital. She was instructed to folow up on that. I will get hospital records. 5. Hyperlipidemia, unspecified hyperlipidemia type  HLD  Patient presents today for hyperlipidemia. Patient currently taking lipitor . Taking medication as prescribed without side effects. Trying to follow a low cholesterol diet. Exercising none  Skips doses of meds: none    Lab Results   Component Value Date/Time    Cholesterol, total 311 (H) 03/27/2019 09:34 AM    HDL Cholesterol 71 03/27/2019 09:34 AM    LDL, calculated 181 (H) 03/27/2019 09:34 AM    VLDL, calculated 59 (H) 03/27/2019 09:34 AM    Triglyceride 296 (H) 03/27/2019 09:34 AM           Prior to Admission medications    Medication Sig Start Date End Date Taking? Authorizing Provider   sertraline (ZOLOFT) 100 mg tablet Take 1.5 Tabs by mouth daily. 6/16/20 6/16/21 Yes Lexi Licona MD   ticagrelor (BRILINTA PO) Take  by mouth. Yes Provider, Historical   carvediloL (COREG) 12.5 mg tablet  1/31/20  Yes Provider, Historical   famotidine (PEPCID) 20 mg tablet  2/18/20  Yes Provider, Historical   cyanocobalamin (VITAMIN B12) 500 mcg tablet Take 500 mcg by mouth daily. Yes Provider, Historical   allopurinoL (ZYLOPRIM) 100 mg tablet Take 1 Tab by mouth daily.  Indications: treatment to prevent acute gout attack 1/28/20  Yes Lexi Licona MD   cholecalciferol (VITAMIN D3) (5000 Units /125 mcg) capsule TAKE 1 CAPSULE EVERY DAY 1/13/20  Yes Lexi Licona MD   atorvastatin (LIPITOR) 40 mg tablet TAKE 1 TABLET EVERY NIGHT 1/13/20  Yes Lexi Licona MD   LORazepam (ATIVAN) 1 mg tablet Take 1 Tab by mouth nightly as needed for Anxiety. Max Daily Amount: 1 mg. 11/7/19  Yes Quintin Joe MD   levothyroxine (SYNTHROID) 25 mcg tablet Take  by mouth Daily (before breakfast). Yes Provider, Historical   colchicine (MITIGARE) 0.6 mg capsule TAKE 2 CAPSULES BY MOUTH ONCE TODAY THEN 1 ONE HOUR LATER TODAY THEN STOP 5/19/19  Yes Quintin Joe MD   ferrous sulfate 325 mg (65 mg iron) tablet Take 1 Tab by mouth daily (with breakfast). 4/11/19  Yes Quintin Joe MD   cranberry extract 450 mg tab Take  by mouth daily. Yes Provider, Historical   glimepiride (AMARYL) 4 mg tablet Take 4 mg by mouth two (2) times a day. Yes Provider, Historical   insulin aspart (NOVOLOG) 100 unit/mL injection 10 Units by SubCUTAneous route daily (with breakfast). 15 units   Yes Provider, Historical   insulin aspart (NOVOLOG) 100 unit/mL injection 15 Units by SubCUTAneous route daily (with dinner). Yes Provider, Historical   timolol (TIMOPTIC) 0.5 % ophthalmic solution Administer 1 Drop to both eyes two (2) times a day. Yes Provider, Historical   anagrelide (AGRYLIN) 0.5 mg capsule Take 0.5 mg by mouth daily. Pt takes 1 tab (0.5) on tuesdays, thursdays , [address] and sundays. Yes Provider, Historical   acetaminophen (TYLENOL) 500 mg tablet Take 500 mg by mouth every six (6) hours as needed for Pain.    Yes Provider, Historical   BD SINGLE USE SWABS REGULAR padm  3/15/19   Provider, Historical   ACCU-CHEK CHANO CONTROL SOLN soln  3/15/19   Provider, Historical   ACCU-CHEK CHANO PLUS TEST STRP strip  3/15/19   Provider, Historical   ACCU-CHEK CHANO PLUS METER misc  3/15/19   Provider, Historical   ACCU-CHEK SOFTCLIX LANCETS misc  3/15/19   Provider, Historical     Allergies   Allergen Reactions    Ace Inhibitors Other (comments)     \"Breaks out\"    Advil [Ibuprofen] Hives     Aspirin is a home med    Codeine Rash and Nausea and Vomiting     \"a little rash\"    Penicillin G Angioedema       Patient Active Problem List   Diagnosis Code  Spinal stenosis M48.00    HTN (hypertension) I10    Diabetes ([facility] E11.9    Hyperlipidemia E78.5    Depression F32.9    Type 2 diabetes with nephropathy ([facility] E11.21    Hypothyroidism E03.9     Patient Active Problem List    Diagnosis Date Noted    Hypothyroidism 09/30/2019    Type 2 diabetes with nephropathy ([facility] 03/28/2019    HTN (hypertension) 10/04/2018    Diabetes ([facility] 10/04/2018    Hyperlipidemia 10/04/2018    Depression 10/04/2018    Spinal stenosis 05/23/2016     Current Outpatient Medications   Medication Sig Dispense Refill    sertraline (ZOLOFT) 100 mg tablet Take 1.5 Tabs by mouth daily. 135 Tab 3    ticagrelor (BRILINTA PO) Take  by mouth.  carvediloL (COREG) 12.5 mg tablet       famotidine (PEPCID) 20 mg tablet       cyanocobalamin (VITAMIN B12) 500 mcg tablet Take 500 mcg by mouth daily.  allopurinoL (ZYLOPRIM) 100 mg tablet Take 1 Tab by mouth daily. Indications: treatment to prevent acute gout attack 90 Tab 1    cholecalciferol (VITAMIN D3) (5000 Units /125 mcg) capsule TAKE 1 CAPSULE EVERY DAY 90 Cap 1    atorvastatin (LIPITOR) 40 mg tablet TAKE 1 TABLET EVERY NIGHT 90 Tab 1    LORazepam (ATIVAN) 1 mg tablet Take 1 Tab by mouth nightly as needed for Anxiety. Max Daily Amount: 1 mg. 30 Tab 2    levothyroxine (SYNTHROID) 25 mcg tablet Take  by mouth Daily (before breakfast).  colchicine (MITIGARE) 0.6 mg capsule TAKE 2 CAPSULES BY MOUTH ONCE TODAY THEN 1 ONE HOUR LATER TODAY THEN STOP 6 Cap 0    ferrous sulfate 325 mg (65 mg iron) tablet Take 1 Tab by mouth daily (with breakfast). 30 Tab 11    cranberry extract 450 mg tab Take  by mouth daily.  glimepiride (AMARYL) 4 mg tablet Take 4 mg by mouth two (2) times a day.  insulin aspart (NOVOLOG) 100 unit/mL injection 10 Units by SubCUTAneous route daily (with breakfast). 15 units      insulin aspart (NOVOLOG) 100 unit/mL injection 15 Units by SubCUTAneous route daily (with dinner).       timolol (TIMOPTIC) 0.5 % ophthalmic solution Administer 1 Drop to both eyes two (2) times a day.  anagrelide (AGRYLIN) 0.5 mg capsule Take 0.5 mg by mouth daily. Pt takes 1 tab (0.5) on tuesdays, thursdays , [address] and sundays.  acetaminophen (TYLENOL) 500 mg tablet Take 500 mg by mouth every six (6) hours as needed for Pain.       BD SINGLE USE SWABS REGULAR padm       ACCU-CHEK CHANO CONTROL SOLN soln       ACCU-CHEK CHANO PLUS TEST STRP strip       ACCU-CHEK CHANO PLUS METER misc       ACCU-CHEK SOFTCLIX LANCETS misc        Allergies   Allergen Reactions    Ace Inhibitors Other (comments)     \"Breaks out\"    Advil [Ibuprofen] Hives     Aspirin is a home med    Codeine Rash and Nausea and Vomiting     \"a little rash\"    Penicillin G Angioedema     Past Medical History:   Diagnosis Date    Anxiety     Arthritis     CAD (coronary artery disease)     Cancer ([facility]     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    Diabetes ([facility]     Dyslipidemia     History of heart surgery 06/04/2020    Hypertension     Hypothyroidism     Ill-defined condition     ACE INHIBITOR ALLERGY WITH COUGH    Ill-defined condition     HYPERTENSIVE CARDIOMYOPATHY WITH MILD DIASTOLIC DYSFUNCTION    Ill-defined condition     glaucoma    PUD (peptic ulcer disease)      Past Surgical History:   Procedure Laterality Date    CARDIAC SURG PROCEDURE UNLIST  1/8/2015    1 stent placed    HX BREAST BIOPSY Right 2009    negative    HX BUNIONECTOMY Left     HX CATARACT REMOVAL Bilateral     HX HAMMER TOE REPAIR Left     HX HEENT      goiter removed    HX LAP CHOLECYSTECTOMY      HX ORTHOPAEDIC       Family History   Problem Relation Age of Onset    Diabetes Mother     Heart Disease Father     Diabetes Brother     Heart Disease Brother     Diabetes Brother     Heart Disease Brother     Anesth Problems Neg Hx      Social History     Tobacco Use    Smoking status: Never Smoker    Smokeless tobacco: Never Used   Substance Use Topics    Alcohol use: No       Review of Systems   Constitutional: Positive for malaise/fatigue. HENT: Negative. Eyes: Negative. Respiratory: Negative. Cardiovascular: Negative. Gastrointestinal: Negative. Genitourinary: Negative. Musculoskeletal: Negative. Skin: Negative. Neurological: Negative. Endo/Heme/Allergies: Negative. Psychiatric/Behavioral: Positive for depression. Negative for suicidal ideas. Patient was located at her home. I was at home while conducting this encounter. I affirm this is a Patient Initiated Episode with an Established Patient who has not had a related appointment within my department in the past 7 days or scheduled within the next 24 hours.     Total Time: minutes: 21-30 minutes    Note: not billable if this call serves to triage the patient into an appointment for the relevant concern      Ofelia Koo MD

## 2020-07-08 ENCOUNTER — TELEPHONE (OUTPATIENT)
Dept: FAMILY MEDICINE CLINIC | Age: 80
End: 2020-07-08

## 2020-07-08 NOTE — TELEPHONE ENCOUNTER
Faxed over to [phone] unable to speak with pt mother to inform her lab order was faxed to [facility] at [phone]

## 2020-07-08 NOTE — TELEPHONE ENCOUNTER
Please Fax lab orders that are already placed in chart to niall, so [address] can draw labs, also notify daughter once lab orders are faxed , thanks

## 2020-07-08 NOTE — TELEPHONE ENCOUNTER
Pt daughter states that pt is due for labs but she has a home health nurse that comes in and would like to have the order placed through them to be filled     [facility] [address]: [phone] (office)    Please call daughter to speak about this request.

## 2020-07-10 ENCOUNTER — DOCUMENTATION ONLY (OUTPATIENT)
Dept: FAMILY MEDICINE CLINIC | Age: 80
End: 2020-07-10

## 2020-07-10 ENCOUNTER — TELEPHONE (OUTPATIENT)
Dept: FAMILY MEDICINE CLINIC | Age: 80
End: 2020-07-10

## 2020-07-10 NOTE — TELEPHONE ENCOUNTER
Please call back & give verbal order, \" start PT for 1 times for the first week, 2 times for 4 weeks and 1 time for the last week.  \" thanks

## 2020-07-10 NOTE — TELEPHONE ENCOUNTER
Anand [phone] from [facility] the pt was discharged from the hospital and he has evaluated the pt to receive PT    He would like to have a verbal for 1 times for the first week, 2 times for 4 weeks and 1 time for the last week.

## 2020-07-10 NOTE — TELEPHONE ENCOUNTER
RC- to [facility] PT) Dr Agustina Gómez  has given a verbal ok  For PT orders

## 2020-07-13 ENCOUNTER — HOSPITAL ENCOUNTER (OUTPATIENT)
Dept: ULTRASOUND IMAGING | Age: 80
Discharge: HOME | End: 2020-07-13
Attending: FAMILY MEDICINE

## 2020-07-13 DIAGNOSIS — E03.9 ACQUIRED HYPOTHYROIDISM: ICD-10-CM

## 2020-07-14 ENCOUNTER — VIRTUAL VISIT (OUTPATIENT)
Dept: FAMILY MEDICINE CLINIC | Age: 80
End: 2020-07-14

## 2020-07-14 DIAGNOSIS — E04.1 THYROID NODULE: Primary | ICD-10-CM

## 2020-07-14 RX ORDER — SPIRONOLACTONE 25 MG/1
TABLET ORAL
COMMUNITY
Start: 2020-05-26

## 2020-07-14 RX ORDER — ATORVASTATIN CALCIUM 40 MG/1
TABLET, FILM COATED ORAL
Qty: 90 TAB | Refills: 1 | Status: SHIPPED | OUTPATIENT
Start: 2020-07-14 | End: 2020-11-18

## 2020-07-14 RX ORDER — LANSOPRAZOLE 30 MG/1
CAPSULE, DELAYED RELEASE ORAL
COMMUNITY
Start: 2020-07-10 | End: 2021-06-14

## 2020-07-14 NOTE — PROGRESS NOTES
Identified pt with two pt identifiers(name and ). Reviewed record in preparation for visit and have obtained necessary documentation. Chief Complaint   Patient presents with    Other     Consultation request- ultrasound for thyroid         Health Maintenance Due   Topic    Eye Exam Retinal or Dilated     Shingrix Vaccine Age 50> (1 of 2)    Lipid Screen     Medicare Yearly Exam     Foot Exam Q1        Coordination of Care Questionnaire:  :   1) Have you been to an emergency room, urgent care, or hospitalized since your last visit? If yes, where when, and reason for visit? Yes, [facility] for fall      2. Have seen or consulted any other health care provider since your last visit? If yes, where when, and reason for visit? Yes,  [facility])        Patient is accompanied by self I have received verbal consent from Wing Sousa to discuss any/all medical information while they are present in the room.

## 2020-07-14 NOTE — PROGRESS NOTES
João Silva is a 67 y.o. female evaluated via telephone on 7/14/2020. Consent:  She and/or health care decision maker is aware that that she may receive a bill for this telephone service, depending on her insurance coverage, and has provided verbal consent to proceed: Yes      Documentation:  I communicated with the patient and/or health care decision maker about  Thyroid ultrasound. Details of this discussion including any medical advice provided:     Assessment & Plan:   Diagnoses and all orders for this visit:    1. Thyroid nodule      WE WILL NOTIFY PATIENT ONCE I GET ULTRASOUND REPORT. Follow-up and Dispositions    · Return in about 2 weeks (around 7/28/2020), or if symptoms worsen or fail to improve, for follow up. I ADVISED PATIENT TO GO TO ER IF SYMPTOMS WORSEN , CHANGE OR FAILS TO IMPROVE. [address] is a 67 y.o. female who was seen for Other (Consultation request- ultrasound for thyroid )      1. Thyroid nodule  PATIENT WAS FOUND TO HAVE THYROID MASS ON CT [facility] DURING HOSPITAL STAY. NO FURTHER WORK UP WAS DONE AT THAT TIE. SHE WAS ADVISED TO FOLLOW UP [facility] PCP. AT FOLLOW UP I ORDRED THYROID ULTRASOUND. PATIENT GOT IT DONE YESTERDAY BUT REPORT IS NOT AVAILABLE YET. SHE WAS CALLING TO DISCUSS RESULTS. Prior to Admission medications    Medication Sig Start Date End Date Taking? Authorizing Provider   atorvastatin (LIPITOR) 40 mg tablet TAKE 1 TABLET EVERY NIGHT 7/14/20  Yes Wilton Harrison MD   lansoprazole (PREVACID) 30 mg capsule TK ONE C PO  QD 7/10/20  Yes Provider, Historical   spironolactone (ALDACTONE) 25 mg tablet TK 1 T PO  ONCE D 5/26/20  Yes Provider, Historical   sertraline (ZOLOFT) 100 mg tablet Take 1.5 Tabs by mouth daily. 6/16/20 6/16/21 Yes Wilton Harrison MD   ticagrelor (BRILINTA PO) Take  by mouth.    Yes Provider, Historical   carvediloL (COREG) 12.5 mg tablet  1/31/20  Yes Provider, Historical   famotidine (PEPCID) 20 mg tablet  2/18/20  Yes Provider, Historical   cyanocobalamin (VITAMIN B12) 500 mcg tablet Take 500 mcg by mouth daily. Yes Provider, Historical   allopurinoL (ZYLOPRIM) 100 mg tablet Take 1 Tab by mouth daily. Indications: treatment to prevent acute gout attack 1/28/20  Yes Edwin Guevara MD   cholecalciferol (VITAMIN D3) (5000 Units /125 mcg) capsule TAKE 1 CAPSULE EVERY DAY 1/13/20  Yes Edwin Guevara MD   LORazepam (ATIVAN) 1 mg tablet Take 1 Tab by mouth nightly as needed for Anxiety. Max Daily Amount: 1 mg. 11/7/19  Yes Edwin Guevara MD   levothyroxine (SYNTHROID) 25 mcg tablet Take  by mouth Daily (before breakfast). Yes Provider, Historical   colchicine (MITIGARE) 0.6 mg capsule TAKE 2 CAPSULES BY MOUTH ONCE TODAY THEN 1 ONE HOUR LATER TODAY THEN STOP 5/19/19  Yes Edwin Guevara MD   ferrous sulfate 325 mg (65 mg iron) tablet Take 1 Tab by mouth daily (with breakfast). 4/11/19  Yes Edwin Guevara MD   BD SINGLE USE SWABS REGULAR padm  3/15/19  Yes Provider, Historical   ACCU-CHEK CHANO CONTROL SOLN soln  3/15/19  Yes Provider, Historical   ACCU-CHEK CHANO PLUS TEST STRP strip  3/15/19  Yes Provider, Historical   ACCU-CHEK CHANO PLUS METER misc  3/15/19  Yes Provider, Historical   ACCU-CHEK SOFTCLIX LANCETS misc  3/15/19  Yes Provider, Historical   cranberry extract 450 mg tab Take  by mouth daily. Yes Provider, Historical   glimepiride (AMARYL) 4 mg tablet Take 4 mg by mouth two (2) times a day. Yes Provider, Historical   insulin aspart (NOVOLOG) 100 unit/mL injection 10 Units by SubCUTAneous route daily (with breakfast). 15 units   Yes Provider, Historical   insulin aspart (NOVOLOG) 100 unit/mL injection 15 Units by SubCUTAneous route daily (with dinner). Yes Provider, Historical   timolol (TIMOPTIC) 0.5 % ophthalmic solution Administer 1 Drop to both eyes two (2) times a day. Yes Provider, Historical   anagrelide (AGRYLIN) 0.5 mg capsule Take 0.5 mg by mouth daily.  Pt takes 1 tab (0.5) on tuesdays, thursdays , saturdays and sundays. Yes Provider, Historical   acetaminophen (TYLENOL) 500 mg tablet Take 500 mg by mouth every six (6) hours as needed for Pain. Yes Provider, Historical   atorvastatin (LIPITOR) 40 mg tablet TAKE 1 TABLET EVERY NIGHT 1/13/20 7/14/20  Luciano Woodard MD     Allergies   Allergen Reactions    Ace Inhibitors Other (comments)     \"Breaks out\"    Advil [Ibuprofen] Hives     Aspirin is a home med    Codeine Rash and Nausea and Vomiting     \"a little rash\"    Penicillin G Angioedema       Patient Active Problem List   Diagnosis Code    Spinal stenosis M48.00    HTN (hypertension) I10    Diabetes ([facility] E11.9    Hyperlipidemia E78.5    Depression F32.9    Type 2 diabetes with nephropathy ([facility] E11.21    Hypothyroidism E03.9     Patient Active Problem List    Diagnosis Date Noted    Hypothyroidism 09/30/2019    Type 2 diabetes with nephropathy ([facility] 03/28/2019    HTN (hypertension) 10/04/2018    Diabetes ([facility] 10/04/2018    Hyperlipidemia 10/04/2018    Depression 10/04/2018    Spinal stenosis 05/23/2016     Current Outpatient Medications   Medication Sig Dispense Refill    atorvastatin (LIPITOR) 40 mg tablet TAKE 1 TABLET EVERY NIGHT 90 Tab 1    lansoprazole (PREVACID) 30 mg capsule TK ONE C PO  QD      spironolactone (ALDACTONE) 25 mg tablet TK 1 T PO  ONCE D      sertraline (ZOLOFT) 100 mg tablet Take 1.5 Tabs by mouth daily. 135 Tab 3    ticagrelor (BRILINTA PO) Take  by mouth.  carvediloL (COREG) 12.5 mg tablet       famotidine (PEPCID) 20 mg tablet       cyanocobalamin (VITAMIN B12) 500 mcg tablet Take 500 mcg by mouth daily.  allopurinoL (ZYLOPRIM) 100 mg tablet Take 1 Tab by mouth daily. Indications: treatment to prevent acute gout attack 90 Tab 1    cholecalciferol (VITAMIN D3) (5000 Units /125 mcg) capsule TAKE 1 CAPSULE EVERY DAY 90 Cap 1    LORazepam (ATIVAN) 1 mg tablet Take 1 Tab by mouth nightly as needed for Anxiety.  Max Daily Amount: 1 mg. 30 Tab 2  levothyroxine (SYNTHROID) 25 mcg tablet Take  by mouth Daily (before breakfast).  colchicine (MITIGARE) 0.6 mg capsule TAKE 2 CAPSULES BY MOUTH ONCE TODAY THEN 1 ONE HOUR LATER TODAY THEN STOP 6 Cap 0    ferrous sulfate 325 mg (65 mg iron) tablet Take 1 Tab by mouth daily (with breakfast). 30 Tab 11    BD SINGLE USE SWABS REGULAR padm       ACCU-CHEK CHANO CONTROL SOLN soln       ACCU-CHEK CHANO PLUS TEST STRP strip       ACCU-CHEK CHANO PLUS METER misc       ACCU-CHEK SOFTCLIX LANCETS misc       cranberry extract 450 mg tab Take  by mouth daily.  glimepiride (AMARYL) 4 mg tablet Take 4 mg by mouth two (2) times a day.  insulin aspart (NOVOLOG) 100 unit/mL injection 10 Units by SubCUTAneous route daily (with breakfast). 15 units      insulin aspart (NOVOLOG) 100 unit/mL injection 15 Units by SubCUTAneous route daily (with dinner).  timolol (TIMOPTIC) 0.5 % ophthalmic solution Administer 1 Drop to both eyes two (2) times a day.  anagrelide (AGRYLIN) 0.5 mg capsule Take 0.5 mg by mouth daily. Pt takes 1 tab (0.5) on tuesdays, thursdays , [address] and sundays.  acetaminophen (TYLENOL) 500 mg tablet Take 500 mg by mouth every six (6) hours as needed for Pain.        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    Diabetes ([facility]     Dyslipidemia     History of heart surgery 06/04/2020    Hypertension     Hypothyroidism     Ill-defined condition     ACE INHIBITOR ALLERGY WITH COUGH    Ill-defined condition     HYPERTENSIVE CARDIOMYOPATHY WITH MILD DIASTOLIC DYSFUNCTION    Ill-defined condition glaucoma    PUD (peptic ulcer disease)      Past Surgical History:   Procedure Laterality Date    CARDIAC SURG PROCEDURE UNLIST  1/8/2015    1 stent placed    HX BREAST BIOPSY Right 2009    negative    HX BUNIONECTOMY Left     HX CATARACT REMOVAL Bilateral     HX HAMMER TOE REPAIR Left     HX HEENT      goiter removed    HX LAP CHOLECYSTECTOMY      HX ORTHOPAEDIC       Family History   Problem Relation Age of Onset    Diabetes Mother     Heart Disease Father     Diabetes Brother     Heart Disease Brother     Diabetes Brother     Heart Disease Brother     Anesth Problems Neg Hx      Social History     Tobacco Use    Smoking status: Never Smoker    Smokeless tobacco: Never Used   Substance Use Topics    Alcohol use: No       Review of Systems   Constitutional: Positive for malaise/fatigue. HENT: Negative. Eyes: Negative. Respiratory: Negative. Cardiovascular: Negative. Gastrointestinal: Negative. Genitourinary: Negative. Musculoskeletal: Negative. Skin: Negative. Neurological: Negative. Endo/Heme/Allergies: Negative. Psychiatric/Behavioral: Negative. Patient was located at her  home. I was at home while conducting this encounter. I affirm this is a Patient Initiated Episode with an Established Patient who has not had a related appointment within my department in the past 7 days or scheduled within the next 24 hours.     Total Time: minutes: 11-20 minutes    Note: not billable if this call serves to triage the patient into an appointment for the relevant concern      Param Hernandez MD

## 2020-07-20 ENCOUNTER — TELEPHONE (OUTPATIENT)
Dept: FAMILY MEDICINE CLINIC | Age: 80
End: 2020-07-20

## 2020-07-20 NOTE — TELEPHONE ENCOUNTER
Corrie Hinson for [facility] [phone]    States pt was evaluate for OT and there are no further OT needed at this time.

## 2020-07-22 LAB — MICROALBUMIN UR TEST STR-MCNC: 9 MG/DL

## 2020-07-28 ENCOUNTER — TELEPHONE (OUTPATIENT)
Dept: FAMILY MEDICINE CLINIC | Age: 80
End: 2020-07-28

## 2020-07-28 ENCOUNTER — VIRTUAL VISIT (OUTPATIENT)
Dept: FAMILY MEDICINE CLINIC | Age: 80
End: 2020-07-28

## 2020-07-28 DIAGNOSIS — D50.9 IRON DEFICIENCY ANEMIA, UNSPECIFIED IRON DEFICIENCY ANEMIA TYPE: Primary | ICD-10-CM

## 2020-07-28 RX ORDER — TICAGRELOR 90 MG/1
TABLET ORAL
COMMUNITY
Start: 2020-07-07 | End: 2021-06-14

## 2020-07-28 NOTE — PROGRESS NOTES
Bhumi Tadeo is a 67 y.o. female evaluated via telephone on 7/28/2020. Consent:  She and/or health care decision maker is aware that that she may receive a bill for this telephone service, depending on her insurance coverage, and has provided verbal consent to proceed: Yes      Documentation:  I communicated with the patient and/or health care decision maker about see below. Details of this discussion including any medical advice provided:      Assessment & Plan:   Diagnoses and all orders for this visit:    1. Iron deficiency anemia, unspecified iron deficiency anemia type      I will get reports of labs and thyroid ultrasound. I will let patient know when I receive the results. Patient will follow-up with Dr. Yuli Potts  for anemia. Follow-up and Dispositions    · Return in about 2 weeks (around 8/11/2020). I ADVISED PATIENT TO GO TO ER IF SYMPTOMS WORSEN , CHANGE OR FAILS TO IMPROVE. [address] is a 67 y.o. female who was seen for Labs (Discuss labs, discuss results from Dr. Yuli Potts)      1. Iron deficiency anemia, unspecified iron deficiency anemia type  Patient reports she had lab work done and her recent hemoglobin was 8 at Dr. Foster Poag office. He is currently taking oral iron. She has follow-up appointment on Friday, 8/4/2020 with hematologist.  She might get iron infusion. She is requesting her lab results were ordered by me. Labs were drawn by [facility] but I have not received the results yet. Her Thyroid ultrasound was done at [place] [id number] but report has not been read yet. Thyroid ultrasound was ordered for follow-up of a mass extending into the left thyroid lobe which was incidental finding during the hospital stay. Prior to Admission medications    Medication Sig Start Date End Date Taking? Authorizing Provider   Brilinta 90 mg tablet TK 1 T PO BID 7/7/20  Yes Provider, Historical   omeprazole/clarith/amoxicillin (OMECLAMOX-LEIGH PO) Take  by mouth.    Yes Provider, Historical   atorvastatin (LIPITOR) 40 mg tablet TAKE 1 TABLET EVERY NIGHT 7/14/20  Yes Óscar Dave MD   lansoprazole (PREVACID) 30 mg capsule TK ONE C PO  QD 7/10/20  Yes Provider, Historical   spironolactone (ALDACTONE) 25 mg tablet TK 1 T PO  ONCE D 5/26/20  Yes Provider, Historical   sertraline (ZOLOFT) 100 mg tablet Take 1.5 Tabs by mouth daily. 6/16/20 6/16/21 Yes Óscar Dave MD   ticagrelor (BRILINTA PO) Take  by mouth. Yes Provider, Historical   carvediloL (COREG) 12.5 mg tablet  1/31/20  Yes Provider, Historical   famotidine (PEPCID) 20 mg tablet  2/18/20  Yes Provider, Historical   cyanocobalamin (VITAMIN B12) 500 mcg tablet Take 500 mcg by mouth daily. Yes Provider, Historical   allopurinoL (ZYLOPRIM) 100 mg tablet Take 1 Tab by mouth daily. Indications: treatment to prevent acute gout attack 1/28/20  Yes Óscar Dave MD   cholecalciferol (VITAMIN D3) (5000 Units /125 mcg) capsule TAKE 1 CAPSULE EVERY DAY 1/13/20  Yes Óscar Dave MD   LORazepam (ATIVAN) 1 mg tablet Take 1 Tab by mouth nightly as needed for Anxiety. Max Daily Amount: 1 mg. 11/7/19  Yes Óscar Dave MD   levothyroxine (SYNTHROID) 25 mcg tablet Take  by mouth Daily (before breakfast). Yes Provider, Historical   colchicine (MITIGARE) 0.6 mg capsule TAKE 2 CAPSULES BY MOUTH ONCE TODAY THEN 1 ONE HOUR LATER TODAY THEN STOP 5/19/19  Yes Óscar Dave MD   ferrous sulfate 325 mg (65 mg iron) tablet Take 1 Tab by mouth daily (with breakfast). 4/11/19  Yes Óscar Dave MD   BD SINGLE USE SWABS REGULAR padm  3/15/19  Yes Provider, Historical   ACCU-CHEK CHANO CONTROL SOLN soln  3/15/19  Yes Provider, Historical   ACCU-CHEK CHANO PLUS TEST STRP strip  3/15/19  Yes Provider, Historical   ACCU-CHEK CHANO PLUS METER misc  3/15/19  Yes Provider, Historical   ACCU-CHEK SOFTCLIX LANCETS misc  3/15/19  Yes Provider, Historical   cranberry extract 450 mg tab Take  by mouth daily.    Yes Provider, Historical   glimepiride (AMARYL) 4 mg tablet Take 4 mg by mouth two (2) times a day. Yes Provider, Historical   insulin aspart (NOVOLOG) 100 unit/mL injection 10 Units by SubCUTAneous route daily (with breakfast). 15 units   Yes Provider, Historical   insulin aspart (NOVOLOG) 100 unit/mL injection 15 Units by SubCUTAneous route daily (with dinner). Yes Provider, Historical   timolol (TIMOPTIC) 0.5 % ophthalmic solution Administer 1 Drop to both eyes two (2) times a day. Yes Provider, Historical   anagrelide (AGRYLIN) 0.5 mg capsule Take 0.5 mg by mouth daily. Pt takes 1 tab (0.5) on tuesdays, thursdays , [address] and sundays. Yes Provider, Historical   acetaminophen (TYLENOL) 500 mg tablet Take 500 mg by mouth every six (6) hours as needed for Pain. Yes Provider, Historical     Allergies   Allergen Reactions    Ace Inhibitors Other (comments)     \"Breaks out\"    Advil [Ibuprofen] Hives     Aspirin is a home med    Codeine Rash and Nausea and Vomiting     \"a little rash\"    Penicillin G Angioedema       Patient Active Problem List   Diagnosis Code    Spinal stenosis M48.00    HTN (hypertension) I10    Diabetes ([facility] E11.9    Hyperlipidemia E78.5    Depression F32.9    Type 2 diabetes with nephropathy ([facility] E11.21    Hypothyroidism E03.9     Patient Active Problem List    Diagnosis Date Noted    Hypothyroidism 09/30/2019    [facility] 03/28/2019    HTN (hypertension) 10/04/2018    Diabetes ([facility] 10/04/2018    Hyperlipidemia 10/04/2018    Depression 10/04/2018    Spinal stenosis 05/23/2016     Current Outpatient Medications   Medication Sig Dispense Refill    Brilinta 90 mg tablet TK 1 T PO BID      omeprazole/clarith/amoxicillin (OMECLAMOX-LEIGH PO) Take  by mouth.       atorvastatin (LIPITOR) 40 mg tablet TAKE 1 TABLET EVERY NIGHT 90 Tab 1    lansoprazole (PREVACID) 30 mg capsule TK ONE C PO  QD      spironolactone (ALDACTONE) 25 mg tablet TK 1 T PO  ONCE D      sertraline (ZOLOFT) 100 mg tablet Take 1.5 Tabs by mouth daily. 135 Tab 3    ticagrelor (BRILINTA PO) Take  by mouth.  carvediloL (COREG) 12.5 mg tablet       famotidine (PEPCID) 20 mg tablet       cyanocobalamin (VITAMIN B12) 500 mcg tablet Take 500 mcg by mouth daily.  allopurinoL (ZYLOPRIM) 100 mg tablet Take 1 Tab by mouth daily. Indications: treatment to prevent acute gout attack 90 Tab 1    cholecalciferol (VITAMIN D3) (5000 Units /125 mcg) capsule TAKE 1 CAPSULE EVERY DAY 90 Cap 1    LORazepam (ATIVAN) 1 mg tablet Take 1 Tab by mouth nightly as needed for Anxiety. Max Daily Amount: 1 mg. 30 Tab 2    levothyroxine (SYNTHROID) 25 mcg tablet Take  by mouth Daily (before breakfast).  colchicine (MITIGARE) 0.6 mg capsule TAKE 2 CAPSULES BY MOUTH ONCE TODAY THEN 1 ONE HOUR LATER TODAY THEN STOP 6 Cap 0    ferrous sulfate 325 mg (65 mg iron) tablet Take 1 Tab by mouth daily (with breakfast). 30 Tab 11    BD SINGLE USE SWABS REGULAR padm       ACCU-CHEK CHANO CONTROL SOLN soln       ACCU-CHEK CHANO PLUS TEST STRP strip       ACCU-CHEK CHANO PLUS METER misc       ACCU-CHEK SOFTCLIX LANCETS misc       cranberry extract 450 mg tab Take  by mouth daily.  glimepiride (AMARYL) 4 mg tablet Take 4 mg by mouth two (2) times a day.  insulin aspart (NOVOLOG) 100 unit/mL injection 10 Units by SubCUTAneous route daily (with breakfast). 15 units      insulin aspart (NOVOLOG) 100 unit/mL injection 15 Units by SubCUTAneous route daily (with dinner).  timolol (TIMOPTIC) 0.5 % ophthalmic solution Administer 1 Drop to both eyes two (2) times a day.  anagrelide (AGRYLIN) 0.5 mg capsule Take 0.5 mg by mouth daily. Pt takes 1 tab (0.5) on tuesdays, thursdays , [address] and sundays.  acetaminophen (TYLENOL) 500 mg tablet Take 500 mg by mouth every six (6) hours as needed for Pain.        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    Diabetes ([facility]     Dyslipidemia     History of heart surgery 06/04/2020    Hypertension     Hypothyroidism     Ill-defined condition     ACE INHIBITOR ALLERGY WITH COUGH    Ill-defined condition     HYPERTENSIVE CARDIOMYOPATHY WITH MILD DIASTOLIC DYSFUNCTION    Ill-defined condition     glaucoma    PUD (peptic ulcer disease)      Past Surgical History:   Procedure Laterality Date    CARDIAC SURG PROCEDURE UNLIST  1/8/2015    1 stent placed    HX BREAST BIOPSY Right 2009    negative    HX BUNIONECTOMY Left     HX CATARACT REMOVAL Bilateral     HX HAMMER TOE REPAIR Left     HX HEENT      goiter removed    HX LAP CHOLECYSTECTOMY      HX ORTHOPAEDIC       Family History   Problem Relation Age of Onset    Diabetes Mother     Heart Disease Father     Diabetes Brother     Heart Disease Brother     Diabetes Brother     Heart Disease Brother     Anesth Problems Neg Hx      Social History     Tobacco Use    Smoking status: Never Smoker    Smokeless tobacco: Never Used   Substance Use Topics    Alcohol use: No       Review of Systems   Constitutional: Positive for malaise/fatigue. HENT: Negative. Eyes: Negative. Respiratory: Negative. Cardiovascular: Negative. Gastrointestinal: Negative. Genitourinary: Negative. Musculoskeletal: Negative. Skin: Negative. Neurological: Negative. Endo/Heme/Allergies: Negative. Psychiatric/Behavioral: Negative. Patient was located at her home. I was at home while conducting this encounter.      I affirm this is a Patient Initiated Episode with an Established Patient who has not had a related appointment within my department in the past 7 days or scheduled within the next 24 hours.     Total Time: minutes: 11-20 minutes    Note: not billable if this call serves to triage the patient into an appointment for the relevant concern      Micah Bermudez MD

## 2020-07-28 NOTE — TELEPHONE ENCOUNTER
Please get lab results from niall  , patient reports labs were drawn by niall & sent to [facility] but I did not get results.  thanks

## 2020-07-28 NOTE — PROGRESS NOTES
Identified pt with two pt identifiers(name and ). Reviewed record in preparation for visit and have obtained necessary documentation. Chief Complaint   Patient presents with   Anaid     Discuss labs, discuss results from Dr. Tilley Seagrove Maintenance Due   Topic    Eye Exam Retinal or Dilated     Shingrix Vaccine Age 50> (1 of 2)    Lipid Screen     Medicare Yearly Exam     Foot Exam Q1     MICROALBUMIN Q1        Coordination of Care Questionnaire:  :   1) Have you been to an emergency room, urgent care, or hospitalized since your last visit? If yes, where when, and reason for visit? Yes      2. Have seen or consulted any other health care provider since your last visit? If yes, where when, and reason for visit? Yes,   [facility] Puls- Hemoglobin (8)        Patient is accompanied by self I have received verbal consent from [id number] [facility] to discuss any/all medical information while they are present in the room.

## 2020-07-29 ENCOUNTER — TELEPHONE (OUTPATIENT)
Dept: FAMILY MEDICINE CLINIC | Age: 80
End: 2020-07-29

## 2020-07-29 NOTE — TELEPHONE ENCOUNTER
Scooter Arevalo,  Can you get Ms. Ma's recent labs from [facility]?  emory    See Dr. Harsh Seo note:    Tanya Rivers MD   Yesterday  Note   Please get lab results from niall  , patient reports labs were drawn by niall & sent to [facility] but I did not get results.  thanks

## 2020-08-03 ENCOUNTER — TELEPHONE (OUTPATIENT)
Dept: FAMILY MEDICINE CLINIC | Age: 80
End: 2020-08-03

## 2020-08-04 ENCOUNTER — TELEPHONE (OUTPATIENT)
Dept: FAMILY MEDICINE CLINIC | Age: 80
End: 2020-08-04

## 2020-08-04 NOTE — TELEPHONE ENCOUNTER
Thanks. I reviewed the labs. I had ordered thyroid labs which were not resulted. Also, thyroid ultrasound was done per patient report but it has not resulted or I have not received the report. Thyroid ultrasound was ordered to evaluate a mass extending into left thyroid lobe per hospital discharge summary.

## 2020-08-04 NOTE — TELEPHONE ENCOUNTER
Hi Peter Veliz had requested labs from [facility]. The girls up front said they scanned into her chart.   mitch

## 2020-08-10 ENCOUNTER — TELEPHONE (OUTPATIENT)
Dept: FAMILY MEDICINE CLINIC | Age: 80
End: 2020-08-10

## 2020-08-10 NOTE — TELEPHONE ENCOUNTER
Anand marin [facility] called wanting to add PT visit Twice a week fro 4 weeks then 1 a week for 4 weeks.  Please call to give verbal orders [phone]

## 2020-08-11 NOTE — TELEPHONE ENCOUNTER
Please call back and give my verbal orders, start PT visit Twice a week fro 4 weeks then 1 a week for 4 weeks.   Thanks

## 2020-08-12 NOTE — TELEPHONE ENCOUNTER
Called pt, verified id [id number]. Gave pt verbal message given. Patient wrote down verbal orders given. Patient also stated that she did labs and are looking for results. Also stated that you would give the results to Dr. Neville Mayfield.

## 2020-11-18 RX ORDER — ATORVASTATIN CALCIUM 40 MG/1
TABLET, FILM COATED ORAL
Qty: 90 TAB | Refills: 1 | Status: SHIPPED | OUTPATIENT
Start: 2020-11-18 | End: 2021-04-01 | Stop reason: SDUPTHER

## 2021-04-05 RX ORDER — ATORVASTATIN CALCIUM 40 MG/1
TABLET, FILM COATED ORAL
Qty: 90 TAB | Refills: 1 | Status: SHIPPED | OUTPATIENT
Start: 2021-04-05

## 2021-05-28 ENCOUNTER — NURSE TRIAGE (OUTPATIENT)
Dept: OTHER | Facility: CLINIC | Age: 81
End: 2021-05-28

## 2021-05-28 ENCOUNTER — VIRTUAL VISIT (OUTPATIENT)
Dept: FAMILY MEDICINE CLINIC | Age: 81
End: 2021-05-28
Payer: MEDICARE

## 2021-05-28 DIAGNOSIS — R05.9 COUGH: Primary | ICD-10-CM

## 2021-05-28 PROCEDURE — 99442 PR PHYS/QHP TELEPHONE EVALUATION 11-20 MIN: CPT | Performed by: NURSE PRACTITIONER

## 2021-05-28 NOTE — TELEPHONE ENCOUNTER
Reason for Disposition   Caller has already spoken with the PCP (or office), and has no further questions    Protocols used: NO CONTACT OR DUPLICATE CONTACT CALL-ADULT-[place]    Seen by a nurse and MD homes service she states this week. MD called today and told her to see her pcp today. States she also had a portable xray today. Patient called [facility] with red flag complaint. Call received from col    Brief description of triage: cough    Triage indicates for patient to be seen today per MD at home care    Care advice provided, patient verbalizes understanding; denies any other questions or concerns; instructed to call back for any new or worsening symptoms. Writer provided warm transfer to Maria Fernanda Culver at [facility] for appointment scheduling. Attention Provider: Thank you for allowing me to participate in the care of your patient. The patient was connected to triage from [facility]. Please do not respond through this encounter as the response is not directed to a shared pool.

## 2021-05-28 NOTE — PROGRESS NOTES
Grant Alonzo is a 79 y.o. female, evaluated via audio-only technology on 5/28/2021 for Flu Like Symptoms  . Assessment & Plan:   Diagnoses and all orders for this visit:    1. Cough    Patient was seen by telephone encounter. Patient has recently been seen by [facility] for cough/fatigue. The chest x-ray was done and patient was placed on azithromycin. Patient reports feeling better today. Patient states that she does have some shortness of breath when moving through different parts of her home. The chest x-ray revealed a right hemisphere elevated diaphragm. It is unclear if this is a new finding for the patient as I do not have another one to compare to. There was no pneumonia seen. Patient reports that she has felt like this in the past and has been diagnosed with pneumonia and that is why she had been seen yesterday. I spoken again with the patient after conferring with Dr. Domenica Espinal and advised the patient to schedule next week with her PCP to be reevaluated and to have repeat chest x-ray as needed. I advised the patient if she had any worsening of symptoms or shortness of breath over the weekend to go to the emergency room    12  Subjective:       Prior to Admission medications    Medication Sig Start Date End Date Taking? Authorizing Provider   atorvastatin (LIPITOR) 40 mg tablet TAKE 1 TABLET EVERY NIGHT 4/5/21   Zoë Barrios MD   Brilinta 90 mg tablet TK 1 T PO BID 7/7/20   Provider, Historical   omeprazole/clarith/amoxicillin (OMECLAMOX-LEIGH PO) Take  by mouth. Provider, Historical   lansoprazole (PREVACID) 30 mg capsule TK ONE C PO  QD 7/10/20   Provider, Historical   spironolactone (ALDACTONE) 25 mg tablet TK 1 T PO  ONCE D 5/26/20   Provider, Historical   sertraline (ZOLOFT) 100 mg tablet Take 1.5 Tabs by mouth daily. 6/16/20 6/16/21  Zoë Barrios MD   ticagrelor (BRILINTA PO) Take  by mouth.     Provider, Historical   carvediloL (COREG) 12.5 mg tablet  1/31/20   Provider, Historical   famotidine (PEPCID) 20 mg tablet  2/18/20   Provider, Historical   cyanocobalamin (VITAMIN B12) 500 mcg tablet Take 500 mcg by mouth daily. Provider, Historical   allopurinoL (ZYLOPRIM) 100 mg tablet Take 1 Tab by mouth daily. Indications: treatment to prevent acute gout attack 1/28/20   Radha Love MD   cholecalciferol (VITAMIN D3) (5000 Units /125 mcg) capsule TAKE 1 CAPSULE EVERY DAY 1/13/20   Radha Love MD   LORazepam (ATIVAN) 1 mg tablet Take 1 Tab by mouth nightly as needed for Anxiety. Max Daily Amount: 1 mg. 11/7/19   Radha Love MD   levothyroxine (SYNTHROID) 25 mcg tablet Take  by mouth Daily (before breakfast). Provider, Historical   colchicine (MITIGARE) 0.6 mg capsule TAKE 2 CAPSULES BY MOUTH ONCE TODAY THEN 1 ONE HOUR LATER TODAY THEN STOP 5/19/19   Radha Love MD   ferrous sulfate 325 mg (65 mg iron) tablet Take 1 Tab by mouth daily (with breakfast). 4/11/19   Radha Love MD   BD SINGLE USE SWABS REGULAR padm  3/15/19   Provider, Historical   ACCU-CHEK CHANO CONTROL SOLN soln  3/15/19   Provider, Historical   ACCU-CHEK CHANO PLUS TEST STRP strip  3/15/19   Provider, Historical   ACCU-CHEK CHANO PLUS METER misc  3/15/19   Provider, Historical   ACCU-CHEK SOFTCLIX LANCETS misc  3/15/19   Provider, Historical   cranberry extract 450 mg tab Take  by mouth daily. Provider, Historical   glimepiride (AMARYL) 4 mg tablet Take 4 mg by mouth two (2) times a day. Provider, Historical   insulin aspart (NOVOLOG) 100 unit/mL injection 10 Units by SubCUTAneous route daily (with breakfast). 15 units    Provider, Historical   insulin aspart (NOVOLOG) 100 unit/mL injection 15 Units by SubCUTAneous route daily (with dinner). Provider, Historical   timolol (TIMOPTIC) 0.5 % ophthalmic solution Administer 1 Drop to both eyes two (2) times a day. Provider, Historical   anagrelide (AGRYLIN) 0.5 mg capsule Take 0.5 mg by mouth daily.  Pt takes 1 tab (0.5) on tuesdays, thursdays , [address] and sundays. Provider, Historical   acetaminophen (TYLENOL) 500 mg tablet Take 500 mg by mouth every six (6) hours as needed for Pain. Provider, Historical     Patient Active Problem List   Diagnosis Code    Spinal stenosis M48.00    HTN (hypertension) I10    [facility] E11.9    Hyperlipidemia E78.5    Depression F32.9    Type 2 diabetes with nephropathy ([facility] E11.21    Hypothyroidism E03.9     Patient Active Problem List    Diagnosis Date Noted    Hypothyroidism 09/30/2019    Type 2 diabetes with nephropathy ([facility] 03/28/2019    HTN (hypertension) 10/04/2018    [facility] 10/04/2018    Hyperlipidemia 10/04/2018    Depression 10/04/2018    Spinal stenosis 05/23/2016     Current Outpatient Medications   Medication Sig Dispense Refill    atorvastatin (LIPITOR) 40 mg tablet TAKE 1 TABLET EVERY NIGHT 90 Tab 1    Brilinta 90 mg tablet TK 1 T PO BID      omeprazole/clarith/amoxicillin (OMECLAMOX-LEIGH PO) Take  by mouth.  lansoprazole (PREVACID) 30 mg capsule TK ONE C PO  QD      spironolactone (ALDACTONE) 25 mg tablet TK 1 T PO  ONCE D      sertraline (ZOLOFT) 100 mg tablet Take 1.5 Tabs by mouth daily. 135 Tab 3    ticagrelor (BRILINTA PO) Take  by mouth.  carvediloL (COREG) 12.5 mg tablet       famotidine (PEPCID) 20 mg tablet       cyanocobalamin (VITAMIN B12) 500 mcg tablet Take 500 mcg by mouth daily.  allopurinoL (ZYLOPRIM) 100 mg tablet Take 1 Tab by mouth daily. Indications: treatment to prevent acute gout attack 90 Tab 1    cholecalciferol (VITAMIN D3) (5000 Units /125 mcg) capsule TAKE 1 CAPSULE EVERY DAY 90 Cap 1    LORazepam (ATIVAN) 1 mg tablet Take 1 Tab by mouth nightly as needed for Anxiety. Max Daily Amount: 1 mg. 30 Tab 2    levothyroxine (SYNTHROID) 25 mcg tablet Take  by mouth Daily (before breakfast).       colchicine (MITIGARE) 0.6 mg capsule TAKE 2 CAPSULES BY MOUTH ONCE TODAY THEN 1 ONE HOUR LATER TODAY THEN STOP 6 Cap 0    ferrous sulfate 325 mg (65 mg iron) tablet Take 1 Tab by mouth daily (with breakfast). 30 Tab 11    BD SINGLE USE SWABS REGULAR padm       ACCU-CHEK CHANO CONTROL SOLN soln       ACCU-CHEK CHANO PLUS TEST STRP strip       ACCU-CHEK CHANO PLUS METER misc       ACCU-CHEK SOFTCLIX LANCETS misc       cranberry extract 450 mg tab Take  by mouth daily.  glimepiride (AMARYL) 4 mg tablet Take 4 mg by mouth two (2) times a day.  insulin aspart (NOVOLOG) 100 unit/mL injection 10 Units by SubCUTAneous route daily (with breakfast). 15 units      insulin aspart (NOVOLOG) 100 unit/mL injection 15 Units by SubCUTAneous route daily (with dinner).  timolol (TIMOPTIC) 0.5 % ophthalmic solution Administer 1 Drop to both eyes two (2) times a day.  anagrelide (AGRYLIN) 0.5 mg capsule Take 0.5 mg by mouth daily. Pt takes 1 tab (0.5) on tuesdays, thursdays , [address] and sundays.  acetaminophen (TYLENOL) 500 mg tablet Take 500 mg by mouth every six (6) hours as needed for Pain. 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 HENT: Negative. Eyes: Negative. Respiratory: Positive for cough.  Negative for shortness of breath. Cardiovascular: Negative. Gastrointestinal: Negative. Genitourinary: Negative. Musculoskeletal: Negative. Skin: Negative. Neurological: Negative. Endo/Heme/Allergies: Negative. Psychiatric/Behavioral: Negative. Patient-Reported Vitals 7/28/2020   Patient-Reported Weight 187lb   Patient-Reported Height 5ft6in   Patient-Reported Pulse 84   Patient-Reported Systolic  766   Patient-Reported Diastolic 50        Sunshine Summers, who was evaluated through a patient-initiated, synchronous (real-time) audio only encounter, and/or her healthcare decision maker, is aware that it is a billable service, with coverage as determined by her insurance carrier. She provided verbal consent to proceed: Yes. She has not had a related appointment within my department in the past 7 days or scheduled within the next 24 hours.       Total Time: minutes: 11-20 minutes    Aida Perera NP

## 2021-06-02 ENCOUNTER — HOSPITAL ENCOUNTER (OUTPATIENT)
Dept: GENERAL RADIOLOGY | Age: 81
Discharge: HOME | End: 2021-06-02
Payer: MEDICARE

## 2021-06-02 ENCOUNTER — OFFICE VISIT (OUTPATIENT)
Dept: FAMILY MEDICINE CLINIC | Age: 81
End: 2021-06-02
Payer: MEDICARE

## 2021-06-02 VITALS
RESPIRATION RATE: 16 BRPM | BODY MASS INDEX: 31.82 KG/M2 | OXYGEN SATURATION: 95 % | WEIGHT: 198 LBS | HEIGHT: 66 IN | TEMPERATURE: 97.6 F | DIASTOLIC BLOOD PRESSURE: 65 MMHG | SYSTOLIC BLOOD PRESSURE: 107 MMHG | HEART RATE: 101 BPM

## 2021-06-02 DIAGNOSIS — E78.5 HYPERLIPIDEMIA, UNSPECIFIED HYPERLIPIDEMIA TYPE: ICD-10-CM

## 2021-06-02 DIAGNOSIS — E03.9 ACQUIRED HYPOTHYROIDISM: ICD-10-CM

## 2021-06-02 DIAGNOSIS — J40 BRONCHITIS: ICD-10-CM

## 2021-06-02 DIAGNOSIS — Z11.59 NEED FOR HEPATITIS C SCREENING TEST: ICD-10-CM

## 2021-06-02 DIAGNOSIS — E55.9 VITAMIN D DEFICIENCY: ICD-10-CM

## 2021-06-02 DIAGNOSIS — Z00.00 MEDICARE ANNUAL WELLNESS VISIT, SUBSEQUENT: Primary | ICD-10-CM

## 2021-06-02 DIAGNOSIS — E11.21 TYPE 2 DIABETES WITH NEPHROPATHY (HCC): ICD-10-CM

## 2021-06-02 PROCEDURE — G8752 SYS BP LESS 140: HCPCS | Performed by: FAMILY MEDICINE

## 2021-06-02 PROCEDURE — G8427 DOCREV CUR MEDS BY ELIG CLIN: HCPCS | Performed by: FAMILY MEDICINE

## 2021-06-02 PROCEDURE — G8417 CALC BMI ABV UP PARAM F/U: HCPCS | Performed by: FAMILY MEDICINE

## 2021-06-02 PROCEDURE — 71046 X-RAY EXAM CHEST 2 VIEWS: CPT

## 2021-06-02 PROCEDURE — 1101F PT FALLS ASSESS-DOCD LE1/YR: CPT | Performed by: FAMILY MEDICINE

## 2021-06-02 PROCEDURE — G9717 DOC PT DX DEP/BP F/U NT REQ: HCPCS | Performed by: FAMILY MEDICINE

## 2021-06-02 PROCEDURE — G8754 DIAS BP LESS 90: HCPCS | Performed by: FAMILY MEDICINE

## 2021-06-02 PROCEDURE — G8399 PT W/DXA RESULTS DOCUMENT: HCPCS | Performed by: FAMILY MEDICINE

## 2021-06-02 PROCEDURE — G0439 PPPS, SUBSEQ VISIT: HCPCS | Performed by: FAMILY MEDICINE

## 2021-06-02 PROCEDURE — G8536 NO DOC ELDER MAL SCRN: HCPCS | Performed by: FAMILY MEDICINE

## 2021-06-02 RX ORDER — ALBUTEROL SULFATE 90 UG/1
1 AEROSOL, METERED RESPIRATORY (INHALATION)
Qty: 1 INHALER | Refills: 5 | Status: SHIPPED | OUTPATIENT
Start: 2021-06-02

## 2021-06-02 RX ORDER — METOPROLOL SUCCINATE 25 MG/1
TABLET, EXTENDED RELEASE ORAL
COMMUNITY
Start: 2021-04-28

## 2021-06-02 RX ORDER — BENZONATATE 100 MG/1
100 CAPSULE ORAL
Qty: 20 CAPSULE | Refills: 0 | Status: SHIPPED | OUTPATIENT
Start: 2021-06-02 | End: 2021-06-09

## 2021-06-02 RX ORDER — AZITHROMYCIN 250 MG/1
250 TABLET, FILM COATED ORAL DAILY
COMMUNITY
End: 2021-06-14

## 2021-06-02 RX ORDER — METHYLPREDNISOLONE 4 MG/1
TABLET ORAL
Qty: 1 DOSE PACK | Refills: 0 | Status: SHIPPED | OUTPATIENT
Start: 2021-06-02 | End: 2021-06-14

## 2021-06-02 NOTE — PATIENT INSTRUCTIONS
Medicare Wellness Visit, Female The best way to live healthy is to have a lifestyle where you eat a well-balanced diet, exercise regularly, limit alcohol use, and quit all forms of tobacco/nicotine, if applicable. Regular preventive services are another way to keep healthy. Preventive services (vaccines, screening tests, monitoring & exams) can help personalize your care plan, which helps you manage your own care. Screening tests can find health problems at the earliest stages, when they are easiest to treat. [facility] follows the current, evidence-based guidelines published by the [facility] ([facility]) when recommending preventive services for our patients. Because we follow these guidelines, sometimes recommendations change over time as research supports it. (For example, mammograms used to be recommended annually. Even though Medicare will still pay for an annual mammogram, the newer guidelines recommend a mammogram every two years for women of average risk). Of course, you and your doctor may decide to screen more often for some diseases, based on your risk and your co-morbidities (chronic disease you are already diagnosed with). Preventive services for you include: - Medicare offers their members a free annual wellness visit, which is time for you and your primary care provider to discuss and plan for your preventive service needs. Take advantage of this benefit every year! 
-All adults over the age of 72 should receive the recommended pneumonia vaccines. Current USPSTF guidelines recommend a series of two vaccines for the best pneumonia protection.  
-All adults should have a flu vaccine yearly and a tetanus vaccine every 10 years.  
-All adults age 48 and older should receive the shingles vaccines (series of two vaccines).      
-All adults age 38-68 who are overweight should have a diabetes screening test once every three years.  
-All adults born between [birth year] and 1964 should be screened once for Hepatitis C. 
-Other screening tests and preventive services for persons with diabetes include: an eye exam to screen for diabetic retinopathy, a kidney function test, a foot exam, and stricter control over your cholesterol.  
-Cardiovascular screening for adults with routine risk involves an electrocardiogram (ECG) at intervals determined by your doctor.  
-Colorectal cancer screenings should be done for adults age 54-65 with no increased risk factors for colorectal cancer. There are a number of acceptable methods of screening for this type of cancer. Each test has its own benefits and drawbacks. Discuss with your doctor what is most appropriate for you during your annual wellness visit. The different tests include: colonoscopy (considered the best screening method), a fecal occult blood test, a fecal DNA test, and sigmoidoscopy. 
 
-A bone mass density test is recommended when a woman turns 65 to screen for osteoporosis. This test is only recommended one time, as a screening. Some providers will use this same test as a disease monitoring tool if you already have osteoporosis. -Breast cancer screenings are recommended every other year for women of normal risk, age 54-69. 
-Cervical cancer screenings for women over age 72 are only recommended with certain risk factors. Here is a list of your current Health Maintenance items (your personalized list of preventive services) with a due date: 
Health Maintenance Due Topic Date Due  
 Hepatitis C Test  Never done  Eye Exam  Never done  Shingles Vaccine (1 of 2) Never done [facility]  05/16/2020

## 2021-06-02 NOTE — PROGRESS NOTES
Patient stated name &   Chief Complaint   Patient presents with   [address]    21     Short of breath, congestion,     Has been vacinated with 2 covid shots        Health Maintenance Due   Topic    Hepatitis C Screening     Eye Exam Retinal or Dilated     Shingrix Vaccine Age 49> (1 of 2)    Medicare Yearly Exam     Foot Exam Q1        Wt Readings from Last 3 Encounters:   21 198 lb (89.8 kg)   20 198 lb (89.8 kg)   19 192 lb (87.1 kg)     Temp Readings from Last 3 Encounters:   21 97.6 °F (36.4 °C) (Temporal)   20 97.7 °F (36.5 °C) (Oral)   19 97.7 °F (36.5 °C) (Oral)     BP Readings from Last 3 Encounters:   21 107/65   20 103/66   19 131/70     Pulse Readings from Last 3 Encounters:   21 (!) 101   20 82   19 71         Learning Assessment:  :     Learning Assessment 10/25/2018   PRIMARY LEARNER Patient   HIGHEST LEVEL OF EDUCATION - PRIMARY LEARNER  DID NOT GRADUATE HIGH SCHOOL   PRIMARY LANGUAGE ENGLISH   LEARNER PREFERENCE PRIMARY READING   ANSWERED BY self   RELATIONSHIP SELF       Depression Screening:  :     3 most recent PHQ Screens 2019   Little interest or pleasure in doing things Not at all   Feeling down, depressed, irritable, or hopeless Not at all   Total Score PHQ 2 0       Fall Risk Assessment:  :     Fall Risk Assessment, last 12 mths 2021   Able to walk? Yes   Fall in past 12 months? 0   Do you feel unsteady? 0   Are you worried about falling 0   Number of falls in past 12 months -   Fall with injury? -       Abuse Screening:  :     Abuse Screening Questionnaire 2019   Do you ever feel afraid of your partner? N   Are you in a relationship with someone who physically or mentally threatens you? N   Is it safe for you to go home? Y       Coordination of Care Questionnaire:  :     1) Have you been to an emergency room, urgent care clinic since your last visit?  No    Hospitalized since your last visit? No             2) Have you seen or consulted any other health care providers outside of [address] [place] [address] since your last visit? No  (Include any pap smears or colon screenings in this section.)    Patient is accompanied by self I have received verbal consent from Karyna Burns to discuss any/all medical information while they are present in the room.

## 2021-06-02 NOTE — PROGRESS NOTES
This is the Subsequent Medicare Annual Wellness Exam, performed 12 months or more after the Initial AWV or the last Subsequent AWV    I have reviewed the patient's medical history in detail and updated the computerized patient record. Assessment/Plan   Education and counseling provided:  Are appropriate based on today's review and evaluation    1. Medicare annual wellness visit, subsequent  -     HEPATITIS C AB; Future  -     THYROID CASCADE PROFILE; Future  -     HEMOGLOBIN A1C WITH EAG; Future  -     URINALYSIS W/ REFLEX CULTURE; Future  -     MICROALBUMIN, UR, RAND W/ MICROALB/CREAT RATIO; Future  -     CBC WITH AUTOMATED DIFF; Future  -     METABOLIC PANEL, COMPREHENSIVE; Future  -     LIPID PANEL; Future  -     VITAMIN D, 25 HYDROXY; Future  2. Bronchitis  -     albuterol (PROVENTIL HFA, VENTOLIN HFA, PROAIR HFA) 90 mcg/actuation inhaler; Take 1 Puff by inhalation every four (4) hours as needed for Wheezing., Normal, Disp-1 Inhaler, R-5  -     methylPREDNISolone (MEDROL DOSEPACK) 4 mg tablet; TAKE AS DIRECTED ON THE PACK, Normal, Disp-1 Dose Pack, R-0  -     benzonatate (TESSALON) 100 mg capsule; Take 1 Capsule by mouth three (3) times daily as needed for Cough for up to 7 days. , Normal, Disp-20 Capsule, R-0  -     XR CHEST PA LAT; Future  -     CBC WITH AUTOMATED DIFF; Future  -     METABOLIC PANEL, COMPREHENSIVE; Future  3. Need for hepatitis C screening test  -     HEPATITIS C AB; Future  4. Vitamin D deficiency  -     VITAMIN D, 25 HYDROXY; Future  5. Acquired hypothyroidism  -     THYROID CASCADE PROFILE; Future  6. Hyperlipidemia, unspecified hyperlipidemia type  -     METABOLIC PANEL, COMPREHENSIVE; Future  -     LIPID PANEL; Future  7. Type 2 diabetes with nephropathy (HCC)  -     HEMOGLOBIN A1C WITH EAG; Future  -     URINALYSIS W/ REFLEX CULTURE; Future  -     MICROALBUMIN, UR, RAND W/ MICROALB/CREAT RATIO; Future  -     CBC WITH AUTOMATED DIFF;  Future  -     METABOLIC PANEL, COMPREHENSIVE; Future       Depression Risk Factor Screening     3 most recent PHQ Screens 4/24/2019   Little interest or pleasure in doing things Not at all   Feeling down, depressed, irritable, or hopeless Not at all   Total Score PHQ 2 0       Alcohol Risk Screen    Do you average more than 1 drink per night or more than 7 drinks a week:  No    On any one occasion in the past three months have you have had more than 3 drinks containing alcohol:  No        Functional Ability and Level of Safety    Hearing: Hearing is good. Activities of Daily Living: The home contains: grab bars  Patient does total self care      Ambulation: with difficulty, uses a cane     Fall Risk:  Fall Risk Assessment, last 12 mths 6/2/2021   Able to walk? Yes   Fall in past 12 months? 0   Do you feel unsteady? 0   Are you worried about falling 0   Number of falls in past 12 months -   Fall with injury? -      Abuse Screen:  Patient is not abused       Cognitive Screening    Has your family/caregiver stated any concerns about your memory: no     Cognitive Screening: Interview      Review of Systems   Constitutional: Positive for malaise/fatigue. HENT: Negative. Eyes: Negative. Respiratory: Positive for cough, sputum production and shortness of breath. Cardiovascular: Negative. Gastrointestinal: Negative. Genitourinary: Negative. Musculoskeletal: Negative. Skin: Negative. Neurological: Negative. Endo/Heme/Allergies: Negative. Psychiatric/Behavioral: Negative. Physical Exam  Constitutional:       Appearance: Normal appearance. HENT:      Right Ear: Tympanic membrane, ear canal and external ear normal.      Left Ear: Tympanic membrane, ear canal and external ear normal.   Eyes:      Extraocular Movements: Extraocular movements intact. Conjunctiva/sclera: Conjunctivae normal.      Pupils: Pupils are equal, round, and reactive to light. Neck:      Thyroid: No thyromegaly.    Cardiovascular:      Rate and Rhythm: Normal rate and regular rhythm. Pulmonary:      Effort: Pulmonary effort is normal.      Breath sounds: Decreased air movement present. Examination of the right-lower field reveals decreased breath sounds. Examination of the left-lower field reveals decreased breath sounds. Decreased breath sounds present. Abdominal:      General: Bowel sounds are normal. There is no distension. Palpations: Abdomen is soft. Tenderness: There is no abdominal tenderness. Musculoskeletal:         General: Normal range of motion. Cervical back: Normal range of motion and neck supple. Right lower leg: No edema. Left lower leg: No edema. Lymphadenopathy:      Cervical: No cervical adenopathy. Skin:     General: Skin is warm and dry. Neurological:      General: No focal deficit present. Mental Status: She is alert and oriented to person, place, and time. Mental status is at baseline.    Psychiatric:         Mood and Affect: Mood normal.         Behavior: Behavior normal.         Health Maintenance Due     Health Maintenance Due   Topic Date Due    Hepatitis C Screening  Never done    Eye Exam Retinal or Dilated  Never done    Shingrix Vaccine Age 50> (1 of 2) Never done    Foot Exam Q1  05/16/2020       Patient Care Team   Patient Care Team:  Yvrose Arechiga MD as PCP - General (Family Medicine)  Yvrose Arechiga [facility] PCP [facility]  Jace Hanson MD (Endocrinology)  Reginaldo Bellamy MD (Cardiology)  Edvin Santos MD (Ophthalmology)  Yokasta Hewitt MD (Gastroenterology)  Alex Li MD (Hematology and Oncology)    History     Patient Active Problem List   Diagnosis Code    Spinal stenosis M48.00    HTN (hypertension) I10    Diabetes ([address] E11.9    Hyperlipidemia E78.5    Depression F32.9    Type 2 diabetes with nephropathy ([facility] E11.21    Hypothyroidism E03.9     Past Medical History:   Diagnosis Date    Anxiety     Arthritis     CAD (coronary artery disease)     Cancer ([facility]     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    Diabetes ([facility]     Dyslipidemia     History of heart surgery 06/04/2020    Hypertension     Hypothyroidism     Ill-defined condition     ACE INHIBITOR ALLERGY WITH COUGH    Ill-defined condition     HYPERTENSIVE CARDIOMYOPATHY WITH MILD DIASTOLIC DYSFUNCTION    Ill-defined condition     glaucoma    PUD (peptic ulcer disease)       Past Surgical History:   Procedure Laterality Date    HX BREAST BIOPSY Right 2009    negative    HX BUNIONECTOMY Left     HX CATARACT REMOVAL Bilateral     HX HAMMER TOE REPAIR Left     HX HEENT      goiter removed    HX LAP CHOLECYSTECTOMY      HX ORTHOPAEDIC      SC CARDIAC SURG PROCEDURE UNLIST  1/8/2015    1 stent placed     Current Outpatient Medications   Medication Sig Dispense Refill    metoprolol succinate (TOPROL-XL) 25 mg XL tablet       azithromycin (ZITHROMAX) 250 mg tablet Take 250 mg by mouth daily. Take 2 tabs 1st day, 1 tab every day for 4 days      albuterol (PROVENTIL HFA, VENTOLIN HFA, PROAIR HFA) 90 mcg/actuation inhaler Take 1 Puff by inhalation every four (4) hours as needed for Wheezing. 1 Inhaler 5    methylPREDNISolone (MEDROL DOSEPACK) 4 mg tablet TAKE AS DIRECTED ON THE PACK 1 Dose Pack 0    benzonatate (TESSALON) 100 mg capsule Take 1 Capsule by mouth three (3) times daily as needed for Cough for up to 7 days. 20 Capsule 0    atorvastatin (LIPITOR) 40 mg tablet TAKE 1 TABLET EVERY NIGHT 90 Tab 1    spironolactone (ALDACTONE) 25 mg tablet TK 1 T PO  ONCE D      sertraline (ZOLOFT) 100 mg tablet Take 1.5 Tabs by mouth daily. 135 Tab 3    famotidine (PEPCID) 20 mg tablet       cyanocobalamin (VITAMIN B12) 500 mcg tablet Take 500 mcg by mouth daily.  allopurinoL (ZYLOPRIM) 100 mg tablet Take 1 Tab by mouth daily.  Indications: treatment to prevent acute gout attack 90 Tab 1    cholecalciferol (VITAMIN D3) (5000 Units /125 mcg) capsule TAKE 1 CAPSULE EVERY DAY 90 Cap 1    LORazepam (ATIVAN) 1 mg tablet Take 1 Tab by mouth nightly as needed for Anxiety. Max Daily Amount: 1 mg. 30 Tab 2    levothyroxine (SYNTHROID) 25 mcg tablet Take  by mouth Daily (before breakfast).  colchicine (MITIGARE) 0.6 mg capsule TAKE 2 CAPSULES BY MOUTH ONCE TODAY THEN 1 ONE HOUR LATER TODAY THEN STOP 6 Cap 0    ACCU-CHEK CHANO CONTROL SOLN soln       cranberry extract 450 mg tab Take  by mouth daily.  insulin aspart (NOVOLOG) 100 unit/mL injection 15 Units by SubCUTAneous route daily (with dinner).  timolol (TIMOPTIC) 0.5 % ophthalmic solution Administer 1 Drop to both eyes two (2) times a day.  acetaminophen (TYLENOL) 500 mg tablet Take 500 mg by mouth every six (6) hours as needed for Pain.  Brilinta 90 mg tablet TK 1 T PO BID (Patient not taking: Reported on 6/2/2021)      lansoprazole (PREVACID) 30 mg capsule TK ONE C PO  QD (Patient not taking: Reported on 6/2/2021)      ticagrelor (BRILINTA PO) Take  by mouth.  (Patient not taking: Reported on 6/2/2021)      BD SINGLE USE SWABS REGULAR padm       ACCU-CHEK CHANO PLUS TEST STRP strip       ACCU-CHEK CHANO PLUS METER misc       ACCU-CHEK SOFTCLIX LANCETS misc        Allergies   Allergen Reactions    Ace Inhibitors Other (comments)     \"Breaks out\"    Advil [Ibuprofen] Hives     Aspirin is a home med    Codeine Rash and Nausea and Vomiting     \"a little rash\"    Penicillin G Angioedema       Family History   Problem Relation Age of Onset    Diabetes Mother     Heart Disease Father     Diabetes Brother     Heart Disease Brother     Diabetes Brother     Heart Disease Brother     Anesth Problems Neg Hx      Social History     Tobacco Use    Smoking status: Never Smoker    Smokeless tobacco: Never Used   Substance Use Topics    Alcohol use: No         Oziel Brown MD

## 2021-06-03 LAB — TSH SERPL-ACNC: 1.39 UIU/ML (ref 0.45–4.5)

## 2021-06-05 LAB
25(OH)D3 SERPL-MCNC: 28.3 NG/ML (ref 30–100)
ALBUMIN SERPL-MCNC: 3.9 G/DL (ref 3.5–5)
ALBUMIN/GLOB SERPL: 1.1 {RATIO} (ref 1.1–2.2)
ALP SERPL-CCNC: 106 U/L (ref 45–117)
ALT SERPL-CCNC: 21 U/L (ref 12–78)
ANION GAP SERPL CALC-SCNC: 7 MMOL/L (ref 5–15)
AST SERPL-CCNC: 17 U/L (ref 15–37)
BASOPHILS # BLD: 0.1 K/UL (ref 0–0.1)
BASOPHILS NFR BLD: 1 % (ref 0–1)
BILIRUB SERPL-MCNC: 0.3 MG/DL (ref 0.2–1)
BUN SERPL-MCNC: 23 MG/DL (ref 6–20)
BUN/CREAT SERPL: 20 (ref 12–20)
CALCIUM SERPL-MCNC: 9.7 MG/DL (ref 8.5–10.1)
CHLORIDE SERPL-SCNC: 101 MMOL/L (ref 97–108)
CHOLEST SERPL-MCNC: 232 MG/DL
CO2 SERPL-SCNC: 29 MMOL/L (ref 21–32)
CREAT SERPL-MCNC: 1.14 MG/DL (ref 0.55–1.02)
DIFFERENTIAL METHOD BLD: ABNORMAL
EOSINOPHIL # BLD: 0.2 K/UL (ref 0–0.4)
EOSINOPHIL NFR BLD: 3 % (ref 0–7)
ERYTHROCYTE [DISTWIDTH] IN BLOOD BY AUTOMATED COUNT: 14.3 % (ref 11.5–14.5)
EST. AVERAGE GLUCOSE BLD GHB EST-MCNC: 223 MG/DL
GLOBULIN SER CALC-MCNC: 3.5 G/DL (ref 2–4)
GLUCOSE SERPL-MCNC: 239 MG/DL (ref 65–100)
HBA1C MFR BLD: 9.4 % (ref 4–5.6)
HCT VFR BLD AUTO: 39.3 % (ref 35–47)
HCV AB SERPL QL IA: NONREACTIVE
HCV COMMENT,HCGAC: NORMAL
HDLC SERPL-MCNC: 88 MG/DL
HDLC SERPL: 2.6 {RATIO} (ref 0–5)
HGB BLD-MCNC: 12 G/DL (ref 11.5–16)
IMM GRANULOCYTES # BLD AUTO: 0.1 K/UL (ref 0–0.04)
IMM GRANULOCYTES NFR BLD AUTO: 1 % (ref 0–0.5)
LDLC SERPL CALC-MCNC: 74.8 MG/DL (ref 0–100)
LYMPHOCYTES # BLD: 1.5 K/UL (ref 0.8–3.5)
LYMPHOCYTES NFR BLD: 16 % (ref 12–49)
MCH RBC QN AUTO: 26.7 PG (ref 26–34)
MCHC RBC AUTO-ENTMCNC: 30.5 G/DL (ref 30–36.5)
MCV RBC AUTO: 87.5 FL (ref 80–99)
MONOCYTES # BLD: 0.7 K/UL (ref 0–1)
MONOCYTES NFR BLD: 7 % (ref 5–13)
NEUTS SEG # BLD: 7 K/UL (ref 1.8–8)
NEUTS SEG NFR BLD: 72 % (ref 32–75)
NRBC # BLD: 0 K/UL (ref 0–0.01)
NRBC BLD-RTO: 0 PER 100 WBC
PLATELET # BLD AUTO: 296 K/UL (ref 150–400)
PMV BLD AUTO: 10.1 FL (ref 8.9–12.9)
POTASSIUM SERPL-SCNC: 4.2 MMOL/L (ref 3.5–5.1)
PROT SERPL-MCNC: 7.4 G/DL (ref 6.4–8.2)
RBC # BLD AUTO: 4.49 M/UL (ref 3.8–5.2)
SODIUM SERPL-SCNC: 137 MMOL/L (ref 136–145)
TRIGL SERPL-MCNC: 346 MG/DL (ref ?–150)
VLDLC SERPL CALC-MCNC: 69.2 MG/DL
WBC # BLD AUTO: 9.6 K/UL (ref 3.6–11)

## 2021-06-09 ENCOUNTER — TELEPHONE (OUTPATIENT)
Dept: FAMILY MEDICINE CLINIC | Age: 81
End: 2021-06-09

## 2021-06-09 NOTE — TELEPHONE ENCOUNTER
----- Message from Amrit Davison sent at 6/9/2021 10:49 AM EDT -----  Regarding: Dr. Melchor Morales  Patient return call    Caller's first and last name and relationship (if not the patient): n/a      Best contact number(s):[phone]      Whose call is being returned: n/a      Details to clarify the request: [address]

## 2021-06-14 ENCOUNTER — OFFICE VISIT (OUTPATIENT)
Dept: FAMILY MEDICINE CLINIC | Age: 81
End: 2021-06-14
Payer: MEDICARE

## 2021-06-14 VITALS
HEART RATE: 95 BPM | WEIGHT: 185 LBS | BODY MASS INDEX: 29.73 KG/M2 | RESPIRATION RATE: 20 BRPM | SYSTOLIC BLOOD PRESSURE: 120 MMHG | TEMPERATURE: 97.4 F | HEIGHT: 66 IN | DIASTOLIC BLOOD PRESSURE: 70 MMHG | OXYGEN SATURATION: 100 %

## 2021-06-14 DIAGNOSIS — G43.909 MIGRAINE WITHOUT STATUS MIGRAINOSUS, NOT INTRACTABLE, UNSPECIFIED MIGRAINE TYPE: Primary | ICD-10-CM

## 2021-06-14 DIAGNOSIS — G47.00 INSOMNIA, UNSPECIFIED TYPE: ICD-10-CM

## 2021-06-14 PROCEDURE — G8752 SYS BP LESS 140: HCPCS | Performed by: FAMILY MEDICINE

## 2021-06-14 PROCEDURE — 1101F PT FALLS ASSESS-DOCD LE1/YR: CPT | Performed by: FAMILY MEDICINE

## 2021-06-14 PROCEDURE — 1090F PRES/ABSN URINE INCON ASSESS: CPT | Performed by: FAMILY MEDICINE

## 2021-06-14 PROCEDURE — G9717 DOC PT DX DEP/BP F/U NT REQ: HCPCS | Performed by: FAMILY MEDICINE

## 2021-06-14 PROCEDURE — G8399 PT W/DXA RESULTS DOCUMENT: HCPCS | Performed by: FAMILY MEDICINE

## 2021-06-14 PROCEDURE — 99213 OFFICE O/P EST LOW 20 MIN: CPT | Performed by: FAMILY MEDICINE

## 2021-06-14 PROCEDURE — G8536 NO DOC ELDER MAL SCRN: HCPCS | Performed by: FAMILY MEDICINE

## 2021-06-14 PROCEDURE — G8427 DOCREV CUR MEDS BY ELIG CLIN: HCPCS | Performed by: FAMILY MEDICINE

## 2021-06-14 PROCEDURE — G8754 DIAS BP LESS 90: HCPCS | Performed by: FAMILY MEDICINE

## 2021-06-14 PROCEDURE — G8417 CALC BMI ABV UP PARAM F/U: HCPCS | Performed by: FAMILY MEDICINE

## 2021-06-14 RX ORDER — MUPIROCIN 20 MG/G
OINTMENT TOPICAL
COMMUNITY
Start: 2021-03-27

## 2021-06-14 RX ORDER — FEBUXOSTAT 40 MG/1
1 TABLET, FILM COATED ORAL
COMMUNITY
Start: 2020-11-02

## 2021-06-14 RX ORDER — TRAZODONE HYDROCHLORIDE 50 MG/1
50 TABLET ORAL
Qty: 30 TABLET | Refills: 5 | Status: SHIPPED | OUTPATIENT
Start: 2021-06-14

## 2021-06-14 RX ORDER — INSULIN HUMAN 100 [IU]/ML
INJECTION, SUSPENSION SUBCUTANEOUS
COMMUNITY

## 2021-06-14 RX ORDER — BUMETANIDE 2 MG/1
TABLET ORAL
COMMUNITY
Start: 2021-04-02

## 2021-06-14 RX ORDER — CLOBETASOL PROPIONATE 0.5 MG/G
OINTMENT TOPICAL
COMMUNITY
Start: 2021-03-27

## 2021-06-14 RX ORDER — INSULIN ASPART 100 [IU]/ML
INJECTION, SUSPENSION SUBCUTANEOUS
COMMUNITY
Start: 2021-06-12

## 2021-06-14 RX ORDER — BUTALBITAL, ACETAMINOPHEN AND CAFFEINE 50; 325; 40 MG/1; MG/1; MG/1
1 TABLET ORAL
Qty: 30 TABLET | Refills: 0 | Status: SHIPPED | OUTPATIENT
Start: 2021-06-14

## 2021-06-14 NOTE — PROGRESS NOTES
Patient stated name &     Chief Complaint   Patient presents with    Follow-up     2 weeks follow up        Health Maintenance Due   Topic    Eye Exam Retinal or Dilated     Shingrix Vaccine Age 50> (1 of 2)    Foot Exam Q1        Wt Readings from Last 3 Encounters:   21 185 lb (83.9 kg)   21 198 lb (89.8 kg)   20 198 lb (89.8 kg)     Temp Readings from Last 3 Encounters:   21 97.4 °F (36.3 °C) (Temporal)   21 97.6 °F (36.4 °C) (Temporal)   20 97.7 °F (36.5 °C) (Oral)     BP Readings from Last 3 Encounters:   21 120/70   21 107/65   20 103/66     Pulse Readings from Last 3 Encounters:   21 95   21 (!) 101   20 82         Learning Assessment:  :     Learning Assessment 10/25/2018   PRIMARY LEARNER Patient   HIGHEST LEVEL OF EDUCATION - PRIMARY LEARNER  DID NOT GRADUATE HIGH SCHOOL   PRIMARY LANGUAGE ENGLISH   LEARNER PREFERENCE PRIMARY READING   ANSWERED BY self   RELATIONSHIP SELF       Depression Screening:  :     3 most recent PHQ Screens 2019   Little interest or pleasure in doing things Not at all   Feeling down, depressed, irritable, or hopeless Not at all   Total Score PHQ 2 0       Fall Risk Assessment:  :     Fall Risk Assessment, last 12 mths 2021   Able to walk? Yes   Fall in past 12 months? 0   Do you feel unsteady? 0   Are you worried about falling 0   Number of falls in past 12 months -   Fall with injury? -       Abuse Screening:  :     Abuse Screening Questionnaire 2019   Do you ever feel afraid of your partner? N   Are you in a relationship with someone who physically or mentally threatens you? N   Is it safe for you to go home? Y       Coordination of Care Questionnaire:  :     1) Have you been to an emergency room, urgent care clinic since your last visit? No    Hospitalized since your last visit?  No             2) Have you seen or consulted any other health care providers outside of [facility] since your last visit? No  (Include any pap smears or colon screenings in this section.)    Patient is accompanied by self I have received verbal consent from Shan Common to discuss any/all medical information while they are present in the room.

## 2021-06-14 NOTE — PROGRESS NOTES
Saima Morataya is a 79 y.o. female , established patient, here for evaluation of the following chief complaint(s):    HPI  Chief Complaint   Patient presents with    Follow-up     2 weeks follow up    Migraine       1. Migraine without status migrainosus, not intractable, unspecified migraine type  Patient is here for follow-up. Her respiratory symptoms have resolved. She now has migraine that developed in the morning. She does not have any maintenance medication for migraine. She reports taking Tylenol which did not help her symptoms. She denies nausea, vertigo, any visual changes or photophobia. 2. Insomnia, unspecified type  Patient reports intermittent insomnia. She would like to try trazodone for insomnia. Review of Systems   Constitutional: Negative. HENT: Negative. Eyes: Negative. Respiratory: Negative. Cardiovascular: Negative. Gastrointestinal: Negative. Genitourinary: Negative. Musculoskeletal: Negative. Skin: Negative. Neurological: Positive for headaches. Endo/Heme/Allergies: Negative. Psychiatric/Behavioral: The patient has insomnia. Physical Exam  Vitals and nursing note reviewed. HENT:      Head: Normocephalic and atraumatic. Right Ear: External ear normal.      Left Ear: External ear normal.      Nose: Nose normal.   Eyes:      Conjunctiva/sclera: Conjunctivae normal.   Cardiovascular:      Rate and Rhythm: Normal rate and regular rhythm. Pulmonary:      Effort: Pulmonary effort is normal.      Breath sounds: Normal breath sounds. Abdominal:      General: Bowel sounds are normal. There is no distension. Palpations: Abdomen is soft. Tenderness: There is no abdominal tenderness. Musculoskeletal:         General: Normal range of motion. Cervical back: Normal range of motion and neck supple. Skin:     General: Skin is warm and dry. Neurological:      Mental Status: She is alert.        /70 (BP 1 Location: Left upper arm, BP Patient Position: Sitting, BP Cuff Size: Adult)   Pulse 95   Temp 97.4 °F (36.3 °C) (Temporal)   Resp 20   Ht 5' 6\" (1.676 m)   Wt 185 lb (83.9 kg)   SpO2 100%   BMI 29.86 kg/m²     Allergies   Allergen Reactions    Ace Inhibitors Other (comments)     \"Breaks out\"    Advil [Ibuprofen] Hives     Aspirin is a home med    Codeine Rash and Nausea and Vomiting     \"a little rash\"    Penicillin G Angioedema       Current Outpatient Medications   Medication Sig    bumetanide (BUMEX) 2 mg tablet     clobetasoL (TEMOVATE) 0.05 % ointment APPLY TOPICALLY TO THE AFFECTED AREA TWICE DAILY    febuxostat (ULORIC) 40 mg tab tablet Take 1 Tablet by mouth.  NovoLOG Mix 70-30FlexPen U-100 100 unit/mL (70-30) inpn     insulin nph-regular human rec (HumuLIN 70/30 U-100 KwikPen) 100 unit/mL (70-30) inpn by SubCUTAneous route.  mupirocin (BACTROBAN) 2 % ointment APPLY TOPICALLY TO THE AFFECTED AREA TWICE DAILY    butalbital-acetaminophen-caffeine (FIORICET, ESGIC) -40 mg per tablet Take 1 Tablet by mouth every four (4) hours as needed for Headache.  traZODone (DESYREL) 50 mg tablet Take 1 Tablet by mouth nightly.  metoprolol succinate (TOPROL-XL) 25 mg XL tablet     albuterol (PROVENTIL HFA, VENTOLIN HFA, PROAIR HFA) 90 mcg/actuation inhaler Take 1 Puff by inhalation every four (4) hours as needed for Wheezing.  atorvastatin (LIPITOR) 40 mg tablet TAKE 1 TABLET EVERY NIGHT    spironolactone (ALDACTONE) 25 mg tablet TK 1 T PO  ONCE D    sertraline (ZOLOFT) 100 mg tablet Take 1.5 Tabs by mouth daily.  famotidine (PEPCID) 20 mg tablet     cyanocobalamin (VITAMIN B12) 500 mcg tablet Take 500 mcg by mouth daily.  allopurinoL (ZYLOPRIM) 100 mg tablet Take 1 Tab by mouth daily.  Indications: treatment to prevent acute gout attack    cholecalciferol (VITAMIN D3) (5000 Units /125 mcg) capsule TAKE 1 CAPSULE EVERY DAY    LORazepam (ATIVAN) 1 mg tablet Take 1 Tab by mouth nightly as needed for Anxiety. Max Daily Amount: 1 mg.  levothyroxine (SYNTHROID) 25 mcg tablet Take  by mouth Daily (before breakfast).  colchicine (MITIGARE) 0.6 mg capsule TAKE 2 CAPSULES BY MOUTH ONCE TODAY THEN 1 ONE HOUR LATER TODAY THEN STOP    cranberry extract 450 mg tab Take  by mouth daily.  insulin aspart (NOVOLOG) 100 unit/mL injection 15 Units by SubCUTAneous route daily (with dinner).  timolol (TIMOPTIC) 0.5 % ophthalmic solution Administer 1 Drop to both eyes two (2) times a day.  BD SINGLE USE SWABS REGULAR padm     ACCU-CHEK CHANO CONTROL SOLN soln     ACCU-CHEK CHANO PLUS TEST STRP strip     ACCU-CHEK CHANO PLUS METER misc     ACCU-CHEK SOFTCLIX LANCETS misc      No current facility-administered medications for this visit.        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    Diabetes ([facility]     Dyslipidemia     History of heart surgery 06/04/2020    Hypertension     Hypothyroidism     Ill-defined condition     ACE INHIBITOR ALLERGY WITH COUGH    Ill-defined condition     HYPERTENSIVE CARDIOMYOPATHY WITH MILD DIASTOLIC DYSFUNCTION    Ill-defined condition     glaucoma    PUD (peptic ulcer disease)        Past Surgical History:   Procedure Laterality Date    HX BREAST BIOPSY Right 2009    negative    HX BUNIONECTOMY Left     HX CATARACT REMOVAL Bilateral     HX HAMMER TOE REPAIR Left     HX HEENT      goiter removed    HX LAP CHOLECYSTECTOMY      HX ORTHOPAEDIC      [place] CARDIAC SURG PROCEDURE UNLIST  1/8/2015    1 stent placed       Problem List  Date Reviewed: 6/14/2021        Codes Class Noted    Hypothyroidism ICD-10-CM: E03.9  ICD-9-CM: 244.9  9/30/2019        Type 2 diabetes with nephropathy ([facility] ICD-10-CM: E11.21  ICD-9-CM: 250.40, 583.81  3/28/2019        HTN (hypertension) ICD-10-CM: I10  ICD-9-CM: 401.9  10/4/2018 [facility] ICD-10-CM: E11.9  ICD-9-CM: 250.00  10/4/2018        Hyperlipidemia ICD-10-CM: E78.5  ICD-9-CM: 272.4  10/4/2018        Depression ICD-10-CM: F32.9  ICD-9-CM: 444  10/4/2018        Spinal stenosis ICD-10-CM: M48.00  ICD-9-CM: 724.00  5/23/2016               No results found for this visit on 06/14/21.      1. Migraine without status migrainosus, not intractable, unspecified migraine type    - butalbital-acetaminophen-caffeine (FIORICET, ESGIC) -40 mg per tablet; Take 1 Tablet by mouth every four (4) hours as needed for Headache. Dispense: 30 Tablet; Refill: 0    2. Insomnia, unspecified type    - traZODone (DESYREL) 50 mg tablet; Take 1 Tablet by mouth nightly. Dispense: 30 Tablet; Refill: 5        Follow-up and Dispositions    · Return if symptoms worsen or fail to improve. I ADVISED PATIENT TO GO TO ER IF SYMPTOMS WORSEN , CHANGE OR FAILS TO IMPROVE. I have discussed the diagnosis with the patient and the intended plan as seen in the above orders. The patient has received an after-visit summary and questions were answered concerning future plans. I have discussed medication side effects and warnings with the patient as well. The patient agrees and understands above plan.            Hernan Dailey MD

## 2021-07-13 ENCOUNTER — TELEPHONE (OUTPATIENT)
Dept: FAMILY MEDICINE CLINIC | Age: 81
End: 2021-07-13

## 2021-07-13 ENCOUNTER — OFFICE VISIT (OUTPATIENT)
Dept: FAMILY MEDICINE CLINIC | Age: 81
End: 2021-07-13
Payer: MEDICARE

## 2021-07-13 VITALS
SYSTOLIC BLOOD PRESSURE: 97 MMHG | TEMPERATURE: 97.2 F | RESPIRATION RATE: 16 BRPM | HEIGHT: 66 IN | OXYGEN SATURATION: 96 % | WEIGHT: 183 LBS | HEART RATE: 102 BPM | BODY MASS INDEX: 29.41 KG/M2 | DIASTOLIC BLOOD PRESSURE: 62 MMHG
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
## 2021-07-13 DIAGNOSIS — S09.90XA TRAUMATIC INJURY OF HEAD, INITIAL ENCOUNTER: ICD-10-CM

## 2021-07-13 DIAGNOSIS — M54.42 ACUTE LEFT-SIDED LOW BACK PAIN WITH LEFT-SIDED SCIATICA: ICD-10-CM

## 2021-07-13 DIAGNOSIS — M25.552 LEFT HIP PAIN: ICD-10-CM

## 2021-07-13 PROCEDURE — G9717 DOC PT DX DEP/BP F/U NT REQ: HCPCS | Performed by: FAMILY MEDICINE

## 2021-07-13 PROCEDURE — G8752 SYS BP LESS 140: HCPCS | Performed by: FAMILY MEDICINE

## 2021-07-13 PROCEDURE — G8754 DIAS BP LESS 90: HCPCS | Performed by: FAMILY MEDICINE

## 2021-07-13 PROCEDURE — 1090F PRES/ABSN URINE INCON ASSESS: CPT | Performed by: FAMILY MEDICINE

## 2021-07-13 PROCEDURE — G8399 PT W/DXA RESULTS DOCUMENT: HCPCS | Performed by: FAMILY MEDICINE

## 2021-07-13 PROCEDURE — 99214 OFFICE O/P EST MOD 30 MIN: CPT | Performed by: FAMILY MEDICINE

## 2021-07-13 PROCEDURE — G8417 CALC BMI ABV UP PARAM F/U: HCPCS | Performed by: FAMILY MEDICINE

## 2021-07-13 PROCEDURE — 1101F PT FALLS ASSESS-DOCD LE1/YR: CPT | Performed by: FAMILY MEDICINE

## 2021-07-13 PROCEDURE — G8427 DOCREV CUR MEDS BY ELIG CLIN: HCPCS | Performed by: FAMILY MEDICINE

## 2021-07-13 PROCEDURE — G8536 NO DOC ELDER MAL SCRN: HCPCS | Performed by: FAMILY MEDICINE

## 2021-07-13 RX ORDER — TRAMADOL HYDROCHLORIDE 50 MG/1
50 TABLET ORAL
Qty: 20 TABLET | Refills: 0 | Status: SHIPPED | OUTPATIENT
Start: 2021-07-13 | End: 2021-07-20

## 2021-07-13 NOTE — PROGRESS NOTES
1. Have you been to the ER, urgent care clinic since your last visit? Hospitalized since your last visit? No    2. Have you seen or consulted any other health care providers outside of the [address] [place] [address] since your last visit? Include any pap smears or colon screening. No     Chief Complaint   Patient presents with    Fall    Hip Pain    Leg Pain     Health Maintenance Due   Topic Date Due    Eye Exam Retinal or Dilated  Never done    Shingrix Vaccine Age 50> (1 of 2) Never done    Foot Exam Q1  05/16/2020    MICROALBUMIN Q1  07/22/2021     Visit Vitals  BP 97/62 (BP 1 Location: Left arm, BP Patient Position: Sitting, BP Cuff Size: Adult)   Pulse (!) 102   Temp 97.2 °F (36.2 °C) (Skin)   Resp 16   Ht 5' 6\" (1.676 m)   Wt 183 lb (83 kg)   SpO2 96%   BMI 29.54 kg/m²     3 most recent PHQ Screens 7/13/2021   Little interest or pleasure in doing things Not at all   Feeling down, depressed, irritable, or hopeless Not at all   Total Score PHQ 2 0     Abuse Screening Questionnaire 7/13/2021   Do you ever feel afraid of your partner? N   Are you in a relationship with someone who physically or mentally threatens you? N   Is it safe for you to go home?  Y     Learning Assessment 10/25/2018   PRIMARY LEARNER Patient   HIGHEST LEVEL OF EDUCATION - PRIMARY LEARNER  DID NOT GRADUATE HIGH SCHOOL   PRIMARY LANGUAGE ENGLISH   LEARNER PREFERENCE PRIMARY READING   ANSWERED BY self   RELATIONSHIP SELF

## 2021-07-13 NOTE — PROGRESS NOTES
Jessica Webb is a 79 y.o. female , established patient, here for evaluation of the following chief complaint(s):    HPI  Chief Complaint   Patient presents with   [facility] Fall     Patient states concerns about stroke.  Hip Pain    Leg Pain       1. Fall, initial encounter  2. Left hip pain  3. Acute left-sided low back pain with left-sided sciatica  4. Traumatic injury of head, initial encounter  Patient reports she had a fall 1-1/2 weeks ago, she fell on the left side and also hit her head. She did not lose consciousness. She fell indoors on her. At that time she did not go to the ER or urgent care. She reports she took 45 minutes to get up as she was alone. Patient does not have home health at this time but she reports they will be starting this week for home health and physical therapy. She reports she recently had a CT scan done at [facility] and they were concerned about TIA. I do not have records of her CT scan at this time. Patient's fall was after her CT scan. I will order another CT scan of the head and x-rays to evaluate for any injury. Patient denies any focal weakness or any neurological changes at this time. I have advised her to go to ER if she develops nausea, worsening headache or any neurological symptoms. I also advised her to increase fluid intake as her blood pressure is low and heart rate is elevated at this time. She reports her left hip pain and low back pain is 8 out of 10. She is requesting pain medication. She reports she has taken tramadol in the past without any side effects. I will prescribe tramadol for pain as she is allergic to codeine and NSAIDs I am unable to prescribe Voltaren gel. Review of Systems   Constitutional: Negative. HENT: Negative. Eyes: Negative. Respiratory: Negative. Cardiovascular: Negative. Gastrointestinal: Negative. Genitourinary: Negative.     Musculoskeletal: Positive for back pain, falls, joint pain and myalgias. Skin: Negative. Neurological: Negative. Endo/Heme/Allergies: Negative. Psychiatric/Behavioral: Negative. Physical Exam  Vitals and nursing note reviewed. HENT:      Head: Normocephalic and atraumatic. Right Ear: External ear normal.      Left Ear: External ear normal.      Nose: Nose normal.   Eyes:      Conjunctiva/sclera: Conjunctivae normal.   Cardiovascular:      Rate and Rhythm: Normal rate and regular rhythm. Pulmonary:      Effort: Pulmonary effort is normal.      Breath sounds: Normal breath sounds. Abdominal:      General: Bowel sounds are normal. There is no distension. Palpations: Abdomen is soft. Tenderness: There is no abdominal tenderness. Musculoskeletal:         General: Normal range of motion. Cervical back: Normal range of motion and neck supple. Right lower leg: No edema. Left lower leg: No edema. Lymphadenopathy:      Cervical: No cervical adenopathy. Skin:     General: Skin is warm and dry. Neurological:      General: No focal deficit present. Mental Status: She is alert and oriented to person, place, and time. Mental status is at baseline. Psychiatric:         Mood and Affect: Mood normal.         Behavior: Behavior normal.         Thought Content:  Thought content normal.         Judgment: Judgment normal.       BP 97/62 (BP 1 Location: Left arm, BP Patient Position: Sitting, BP Cuff Size: Adult)   Pulse (!) 102   Temp 97.2 °F (36.2 °C) (Skin)   Resp 16   Ht 5' 6\" (1.676 m)   Wt 183 lb (83 kg)   SpO2 96%   BMI 29.54 kg/m²     Allergies   Allergen Reactions    Ace Inhibitors Other (comments)     \"Breaks out\"    Advil [Ibuprofen] Hives     Aspirin is a home med    Codeine Rash and Nausea and Vomiting     \"a little rash\"    Penicillin G Angioedema       Current Outpatient Medications   Medication Sig    traMADoL (ULTRAM) 50 mg tablet Take 1 Tablet by mouth every six (6) hours as needed for Pain for up to 7 days. Max Daily Amount: 200 mg.  bumetanide (BUMEX) 2 mg tablet     clobetasoL (TEMOVATE) 0.05 % ointment APPLY TOPICALLY TO THE AFFECTED AREA TWICE DAILY    febuxostat (ULORIC) 40 mg tab tablet Take 1 Tablet by mouth.  NovoLOG Mix 70-30FlexPen U-100 100 unit/mL (70-30) inpn     insulin nph-regular human rec (HumuLIN 70/30 U-100 KwikPen) 100 unit/mL (70-30) inpn by SubCUTAneous route.  mupirocin (BACTROBAN) 2 % ointment APPLY TOPICALLY TO THE AFFECTED AREA TWICE DAILY    butalbital-acetaminophen-caffeine (FIORICET, ESGIC) -40 mg per tablet Take 1 Tablet by mouth every four (4) hours as needed for Headache.  traZODone (DESYREL) 50 mg tablet Take 1 Tablet by mouth nightly.  metoprolol succinate (TOPROL-XL) 25 mg XL tablet     atorvastatin (LIPITOR) 40 mg tablet TAKE 1 TABLET EVERY NIGHT    spironolactone (ALDACTONE) 25 mg tablet TK 1 T PO  ONCE D    famotidine (PEPCID) 20 mg tablet     cyanocobalamin (VITAMIN B12) 500 mcg tablet Take 500 mcg by mouth daily.  allopurinoL (ZYLOPRIM) 100 mg tablet Take 1 Tab by mouth daily. Indications: treatment to prevent acute gout attack    cholecalciferol (VITAMIN D3) (5000 Units /125 mcg) capsule TAKE 1 CAPSULE EVERY DAY    LORazepam (ATIVAN) 1 mg tablet Take 1 Tab by mouth nightly as needed for Anxiety. Max Daily Amount: 1 mg.  levothyroxine (SYNTHROID) 25 mcg tablet Take  by mouth Daily (before breakfast).  colchicine (MITIGARE) 0.6 mg capsule TAKE 2 CAPSULES BY MOUTH ONCE TODAY THEN 1 ONE HOUR LATER TODAY THEN STOP    cranberry extract 450 mg tab Take  by mouth daily.  insulin aspart (NOVOLOG) 100 unit/mL injection 15 Units by SubCUTAneous route daily (with dinner).  timolol (TIMOPTIC) 0.5 % ophthalmic solution Administer 1 Drop to both eyes two (2) times a day.  albuterol (PROVENTIL HFA, VENTOLIN HFA, PROAIR HFA) 90 mcg/actuation inhaler Take 1 Puff by inhalation every four (4) hours as needed for Wheezing.  (Patient not taking: Reported on 7/13/2021)    BD SINGLE USE SWABS REGULAR padm  (Patient not taking: Reported on 7/13/2021)    ACCU-CHEK CHANO CONTROL SOLN soln  (Patient not taking: Reported on 7/13/2021)    ACCU-CHEK CHANO PLUS TEST STRP strip  (Patient not taking: Reported on 7/13/2021)   [address]  (Patient not taking: Reported on 7/13/2021)    ACCU-CHEK SOFTCLIX LANCETS misc  (Patient not taking: Reported on 7/13/2021)     No current facility-administered medications for this visit.        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    [facility]     Dyslipidemia     History of heart surgery 06/04/2020    Hypertension     Hypothyroidism     Ill-defined condition     ACE INHIBITOR ALLERGY WITH COUGH    Ill-defined condition     HYPERTENSIVE CARDIOMYOPATHY WITH MILD DIASTOLIC DYSFUNCTION    Ill-defined condition     glaucoma    PUD (peptic ulcer disease)        Past Surgical History:   Procedure Laterality Date    HX BREAST BIOPSY Right 2009    negative    HX BUNIONECTOMY Left     HX CATARACT REMOVAL Bilateral     HX HAMMER TOE REPAIR Left     HX HEENT      goiter removed    HX LAP CHOLECYSTECTOMY      HX ORTHOPAEDIC      [place] CARDIAC SURG PROCEDURE UNLIST  1/8/2015    1 stent placed       Problem List  Date Reviewed: 7/13/2021        Codes Class Noted    Hypothyroidism ICD-10-CM: E03.9  ICD-9-CM: 244.9  9/30/2019        Type 2 diabetes with nephropathy ([facility] ICD-10-CM: E11.21  ICD-9-CM: 250.40, 583.81  3/28/2019        HTN (hypertension) ICD-10-CM: I10  ICD-9-CM: 401.9  10/4/2018        Diabetes ([facility] ICD-10-CM: E11.9  ICD-9-CM: 250.00  10/4/2018        Hyperlipidemia ICD-10-CM: E78.5  ICD-9-CM: 272.4  10/4/2018        Depression ICD-10-CM: F32.9  ICD-9-CM: 311  10/4/2018        Spinal stenosis ICD-10-CM: M48.00  ICD-9-CM: 724.00 5/23/2016               No results found for this visit on 07/13/21. 1. Fall, initial encounter    - CT HEAD WO CONT; Future  - XR HIP LT W OR WO PELV 2-3 VWS; Future  - XR SPINE LUMB 2 OR 3 V; Future    2. Left hip pain    - XR HIP LT W OR WO PELV 2-3 VWS; Future  - traMADoL (ULTRAM) 50 mg tablet; Take 1 Tablet by mouth every six (6) hours as needed for Pain for up to 7 days. Max Daily Amount: 200 mg. Dispense: 20 Tablet; Refill: 0    3. Acute left-sided low back pain with left-sided sciatica   reviewed  - XR SPINE LUMB 2 OR 3 V; Future  - traMADoL (ULTRAM) 50 mg tablet; Take 1 Tablet by mouth every six (6) hours as needed for Pain for up to 7 days. Max Daily Amount: 200 mg. Dispense: 20 Tablet; Refill: 0    4. Traumatic injury of head, initial encounter    - CT HEAD WO CONT; Future        Follow-up and Dispositions    · Return in about 2 weeks (around 7/27/2021), or if symptoms worsen or fail to improve, for follow up. I ADVISED PATIENT TO GO TO ER IF SYMPTOMS WORSEN , CHANGE OR FAILS TO IMPROVE. I have discussed the diagnosis with the patient and the intended plan as seen in the above orders. The patient has received an after-visit summary and questions were answered concerning future plans. I have discussed medication side effects and warnings with the patient as well. The patient agrees and understands above plan.            Hardik Brown MD

## 2021-07-13 NOTE — TELEPHONE ENCOUNTER
Please request recent hospital records and CT scan from [facility]. Patient was here for follow-up today but I did not have any records from [facility].   Thanks

## 2021-07-14 ENCOUNTER — TELEPHONE (OUTPATIENT)
Dept: FAMILY MEDICINE CLINIC | Age: 81
End: 2021-07-14

## 2021-07-14 DIAGNOSIS — E07.9 THYROID MASS: Primary | ICD-10-CM

## 2021-07-14 NOTE — TELEPHONE ENCOUNTER
Telephone conversation with the patient. Informed her that only have records of her CT scan from last year 6/26/2020 that was done in [facility]. 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 At that time I had ordered an ultrasound of the thyroid to evaluate this further. The ultrasound was never done. I reminded patient to get ultrasound of thyroid done. Her endocrinologist is Dr. Brittanie Peralta and I have advised patient to follow-up with her for further evaluation of thyroid mass. Patient also reports that her CT scan that was ordered before her fall to evaluate for possible TIA was ordered by her heme-onc Dr. Evelyne Jennings. This was done at the imaging place on [address]. This was not done at [facility] so I will not be able to access her records. I have advised patient to follow-up with Dr. Evelyne Jennings to get those results. Patient understands and agrees.

## 2021-07-15 ENCOUNTER — TELEPHONE (OUTPATIENT)
Dept: FAMILY MEDICINE CLINIC | Age: 81
End: 2021-07-15

## 2021-07-15 DIAGNOSIS — M25.552 LEFT HIP PAIN: ICD-10-CM
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
## 2021-07-15 DIAGNOSIS — M54.42 ACUTE LEFT-SIDED LOW BACK PAIN WITH LEFT-SIDED SCIATICA: ICD-10-CM

## 2021-07-15 DIAGNOSIS — R53.83 FATIGUE, UNSPECIFIED TYPE: ICD-10-CM

## 2021-07-15 NOTE — TELEPHONE ENCOUNTER
----- Message from Shania Brady sent at 7/15/2021 12:40 PM EDT -----  Regarding: /telephone  Contact: [phone]  General Message/Vendor Calls    Caller's first and last name: N/A      Reason for call: She is requesting an order to [facility] for PT to be faxed to [phone]. Callback required yes/no and why: Yes when completed.        Best contact number(s):[phone]       Details to clarify the request: N/A      Shania Brady

## 2021-07-16 ENCOUNTER — TELEPHONE (OUTPATIENT)
Dept: FAMILY MEDICINE CLINIC | Age: 81
End: 2021-07-16

## 2021-07-16 NOTE — TELEPHONE ENCOUNTER
----- Message from Muriel Oregon sent at 7/16/2021  2:11 PM EDT -----  Regarding: Dr. Emma Jennings (if not patient): n/a      Relationship of caller (if not patient): kenton Sanford contact number(s): [phone]      Name of medication and dosage if known: diabetic supplies, trumetrics meter and test strips, lancits 33g (90 day supply)      Is patient out of this medication (yes/no): yes      Pharmacy name: [facility]    Pharmacy listed in chart? (yes/no): yes  Pharmacy phone number: [phone]      Details to clarify the request: [employer]

## 2021-07-16 NOTE — TELEPHONE ENCOUNTER
----- Message from Roman Damon sent at 7/16/2021  1:59 PM EDT -----  Regarding: MD Caprice/Telephone  General Message/Vendor Calls    Caller's first and last name: Rika Age with [address] [facility]      Reason for call: Clarification / additional info needed. Callback required yes/no and why:      Best contact number(s): [phone]      Details to clarify the request: Skilled nursing and PT orders have been received, clarification for skilled nursing (wound care, med management, etc). Also requesting last visit notes to be faxed to [phone].       Roman Damon

## 2021-07-18 DIAGNOSIS — E11.21 TYPE 2 DIABETES WITH NEPHROPATHY (HCC): Primary | ICD-10-CM

## 2021-07-18 RX ORDER — IBUPROFEN 200 MG
CAPSULE ORAL
Qty: 100 STRIP | Refills: 5 | Status: SHIPPED | OUTPATIENT
Start: 2021-07-18

## 2021-07-18 RX ORDER — INSULIN PUMP SYRINGE, 3 ML
EACH MISCELLANEOUS
Qty: 1 KIT | Refills: 0 | Status: SHIPPED | OUTPATIENT
Start: 2021-07-18

## 2021-07-18 RX ORDER — LANCETS
EACH MISCELLANEOUS
Qty: 1 EACH | Refills: 11 | Status: SHIPPED | OUTPATIENT
Start: 2021-07-18 | End: 2021-07-18 | Stop reason: SDUPTHER

## 2021-07-18 RX ORDER — LANCETS
EACH MISCELLANEOUS
Qty: 100 EACH | Refills: 11 | Status: SHIPPED | OUTPATIENT
Start: 2021-07-18

## 2021-07-19 NOTE — TELEPHONE ENCOUNTER
Bijal Coronel,    I faxed the Home Health referral letter to  [address] [place] [address]. F     Also, I faxed last notes to [address] [place] [address]. See his message.   mitch

## 2021-07-20 NOTE — TELEPHONE ENCOUNTER
Please call back give my verbal orders, nursing to check vital signs, med rec. No wound care needed at this time. Also fax visit last notes as requested.   Thanks

## 2021-07-21 ENCOUNTER — TELEPHONE (OUTPATIENT)
Dept: FAMILY MEDICINE CLINIC | Age: 81
End: 2021-07-21

## 2021-07-21 NOTE — TELEPHONE ENCOUNTER
Faxed last office notes and left message on Mr. Gregory Yee at [address] [facility]  Per Dr. Leon Credit, Nursing to ck vital signs, meds rec., No wound care needed at this time.   F 21

## 2021-07-26 ENCOUNTER — TELEPHONE (OUTPATIENT)
Dept: FAMILY MEDICINE CLINIC | Age: 81
End: 2021-07-26

## 2021-07-26 ENCOUNTER — HOSPITAL ENCOUNTER (OUTPATIENT)
Dept: CT IMAGING | Age: 81
Discharge: HOME | End: 2021-07-26
Attending: FAMILY MEDICINE
Payer: MEDICARE

## 2021-07-26 ENCOUNTER — HOSPITAL ENCOUNTER (OUTPATIENT)
Dept: GENERAL RADIOLOGY | Age: 81
Discharge: HOME | End: 2021-07-26
Attending: FAMILY MEDICINE
Payer: MEDICARE

## 2021-07-26 ENCOUNTER — HOSPITAL ENCOUNTER (OUTPATIENT)
Dept: ULTRASOUND IMAGING | Age: 81
Discharge: HOME | End: 2021-07-26
Attending: FAMILY MEDICINE
Payer: MEDICARE

## 2021-07-26 DIAGNOSIS — S09.90XA TRAUMATIC INJURY OF HEAD, INITIAL ENCOUNTER: ICD-10-CM

## 2021-07-26 DIAGNOSIS — S09.90XA TRAUMATIC INJURY OF HEAD, INITIAL ENCOUNTER: Primary | ICD-10-CM

## 2021-07-26 DIAGNOSIS — R93.0 ABNORMAL CT SCAN OF HEAD: ICD-10-CM

## 2021-07-26 DIAGNOSIS — E03.9 ACQUIRED HYPOTHYROIDISM: ICD-10-CM

## 2021-07-26 DIAGNOSIS — M25.552 LEFT HIP PAIN: ICD-10-CM

## 2021-07-26 DIAGNOSIS — M54.42 ACUTE LEFT-SIDED LOW BACK PAIN WITH LEFT-SIDED SCIATICA: ICD-10-CM

## 2021-07-26 DIAGNOSIS — G45.9 TIA (TRANSIENT ISCHEMIC ATTACK): ICD-10-CM
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
## 2021-07-26 PROCEDURE — 72100 X-RAY EXAM L-S SPINE 2/3 VWS: CPT

## 2021-07-26 PROCEDURE — 70450 CT HEAD/BRAIN W/O DYE: CPT

## 2021-07-26 PROCEDURE — 73502 X-RAY EXAM HIP UNI 2-3 VIEWS: CPT

## 2021-07-26 PROCEDURE — 76536 US EXAM OF HEAD AND NECK: CPT

## 2021-07-26 NOTE — PROGRESS NOTES
Please inform patient, CT scan of the head shows some calcification in temporal lobe. Radiologist has recommended MRI. I have placed referral for neurology, please give neurology referral information to patient. Patient to make appointment with neurologist for further evaluation and to get MRI ordered.   Thanks

## 2021-07-26 NOTE — TELEPHONE ENCOUNTER
----- Message from [facility] sent at 7/26/2021  3:03 PM EDT -----  Regarding: Dr. Jonny Murphy Message/Vendor Calls    Caller's first and last name: Pt      Reason for call: Can the provider call the pt when the test results are in to discuss these please      Callback required yes/no and why: yes      Best contact number(s): [phone]      Details to clarify the request: N/A      [facility]

## 2021-07-27 ENCOUNTER — TELEPHONE (OUTPATIENT)
Dept: FAMILY MEDICINE CLINIC | Age: 81
End: 2021-07-27

## 2021-07-27 NOTE — TELEPHONE ENCOUNTER
Please call back, give my verbal order, \"discontinue OT per patient request, consult medical social worker to assist patient safety, skilled nursing once a week for 8 weeks\".   Thanks

## 2021-07-27 NOTE — TELEPHONE ENCOUNTER
----- Message from Gillian Morocho sent at 7/27/2021  2:42 PM EDT -----  Regarding: Dr. Lynne Vazquez Message/Vendor Calls    Caller's first and last name: Renard from [facility]       Reason for call: Needs a verbal order for physical therapy.        Callback required yes/no and why: yes, to obtain      Best contact number(s): [phone]      Details to clarify the request: N/A       Gillian Morocho

## 2021-07-27 NOTE — TELEPHONE ENCOUNTER
HI Dr. Alexandru Marie,  I gave CT results to Ms. Ma but couldn't give a good description of calcification. She didn't understand.   How can I explain this to her?  mitch

## 2021-07-27 NOTE — TELEPHONE ENCOUNTER
Hi Dr. Emory Murdock,    Ms. Rony Viji is requesting a Neurologist closer to her home, preferably at [facility] or in the area. Also, MRI has to be an open MRI. She is claustrophobic.   mitch

## 2021-07-27 NOTE — TELEPHONE ENCOUNTER
[facility] from [address] Emir Hwangstefany states that they would like to add pt medical social worker. She also states that pt is declining OT. She would like to have an verbal for nursing for 1 week for 8 weeks.     [phone]

## 2021-07-27 NOTE — TELEPHONE ENCOUNTER
I spoke to Christina Landon at [facility] and gave your orders to discontinue OT per patient's request, consult medical social worker to assist patient safely, skilled nursing once a week for 8 weeks.

## 2021-07-27 NOTE — TELEPHONE ENCOUNTER
----- Message from Danford Duane Page sent at 7/27/2021  3:22 PM EDT -----  Regarding: Dr. Carine Verde  Patient return call    Caller's first and last name and relationship (if not the patient): n/a      Best contact number(s): [phone]      Whose call is being returned: Uncertain      Details to clarify the request: Patient was  call but not certain who she was speaking with      Vania Arceo

## 2021-07-30 ENCOUNTER — TELEPHONE (OUTPATIENT)
Dept: FAMILY MEDICINE CLINIC | Age: 81
End: 2021-07-30

## 2021-07-30 NOTE — TELEPHONE ENCOUNTER
----- Message from Leda Yen sent at 7/30/2021  9:55 AM EDT -----  Regarding: Dr. Kortney Powers: [phone]  Patient return call    Caller's first and last name and relationship (if not the patient): N/a      Best contact number(s):[phone]      Whose call is being returned: Unknown, regarding testing results. Details to clarify the request: Pt states she is going to a neurologist on 8/17/21, and would like to discuss this as well.        Leda Yen

## 2021-08-02 ENCOUNTER — TELEPHONE (OUTPATIENT)
Dept: FAMILY MEDICINE CLINIC | Age: 81
End: 2021-08-02

## 2021-08-02 NOTE — TELEPHONE ENCOUNTER
Please call patient and inform, all her test results were mailed to her. Please get neurologist information and fax recent lab test results and CT scan results to neurology office. Patient can also come in to  her results from  if she like.   Thanks

## 2021-08-02 NOTE — TELEPHONE ENCOUNTER
Results given pt has already establish care with an neurologist and has an appt coming up soon and will follow up with neuro.    ----- Message from Delmis Downs sent at 8/2/2021  9:01 AM EDT -----  Regarding: Dr. David Hardy first and last name: pt      Reason for call: Pt called last week for test results and hasn't heard back. Would like to speak with Dr. Ever Barriga to get results from testing from 7/26/21. Pt states this is her 3rd call.        Callback required yes/no and why: yes      Best contact number(s): [phone]      Details to clarify the request: n/a       Delmis Downs

## 2021-08-06 ENCOUNTER — DOCUMENTATION ONLY (OUTPATIENT)
Dept: FAMILY MEDICINE CLINIC | Age: 81
End: 2021-08-06

## 2021-08-09 DIAGNOSIS — F41.9 ANXIETY: ICD-10-CM

## 2021-08-09 RX ORDER — LORAZEPAM 1 MG/1
1 TABLET ORAL
Qty: 30 TABLET | Refills: 2 | OUTPATIENT
Start: 2021-08-09

## 2021-08-10 NOTE — PROGRESS NOTES
CT results & labs Forms completed and faxed by 0806/2021 to   Dr. Mindy Meade- (f): [phone]    Confirmation: OK

## 2021-08-30 ENCOUNTER — TELEPHONE (OUTPATIENT)
Dept: FAMILY MEDICINE CLINIC | Age: 81
End: 2021-08-30

## 2022-03-18 PROBLEM — I10 HTN (HYPERTENSION): Status: ACTIVE | Noted: 2018-10-04

## 2022-03-19 PROBLEM — E11.9 DIABETES (HCC): Status: ACTIVE | Noted: 2018-10-04

## 2022-03-20 PROBLEM — E11.21 TYPE 2 DIABETES WITH NEPHROPATHY (HCC): Status: ACTIVE | Noted: 2019-03-28

## 2022-03-20 PROBLEM — F32.A DEPRESSION: Status: ACTIVE | Noted: 2018-10-04

## 2022-03-20 PROBLEM — E78.5 HYPERLIPIDEMIA: Status: ACTIVE | Noted: 2018-10-04

## 2022-03-20 PROBLEM — E03.9 HYPOTHYROIDISM: Status: ACTIVE | Noted: 2019-09-30
